# Patient Record
Sex: FEMALE | Race: WHITE | NOT HISPANIC OR LATINO | Employment: FULL TIME | URBAN - METROPOLITAN AREA
[De-identification: names, ages, dates, MRNs, and addresses within clinical notes are randomized per-mention and may not be internally consistent; named-entity substitution may affect disease eponyms.]

---

## 2021-05-06 ENCOUNTER — TELEPHONE (OUTPATIENT)
Dept: SCHEDULING | Facility: IMAGING CENTER | Age: 21
End: 2021-05-06

## 2021-05-07 SDOH — ECONOMIC STABILITY: TRANSPORTATION INSECURITY
IN THE PAST 12 MONTHS, HAS THE LACK OF TRANSPORTATION KEPT YOU FROM MEDICAL APPOINTMENTS OR FROM GETTING MEDICATIONS?: NO

## 2021-05-07 SDOH — HEALTH STABILITY: PHYSICAL HEALTH: ON AVERAGE, HOW MANY DAYS PER WEEK DO YOU ENGAGE IN MODERATE TO STRENUOUS EXERCISE (LIKE A BRISK WALK)?: 1 DAY

## 2021-05-07 SDOH — ECONOMIC STABILITY: HOUSING INSECURITY
IN THE LAST 12 MONTHS, WAS THERE A TIME WHEN YOU DID NOT HAVE A STEADY PLACE TO SLEEP OR SLEPT IN A SHELTER (INCLUDING NOW)?: NO

## 2021-05-07 SDOH — ECONOMIC STABILITY: HOUSING INSECURITY: IN THE LAST 12 MONTHS, HOW MANY PLACES HAVE YOU LIVED?: 2

## 2021-05-07 SDOH — ECONOMIC STABILITY: INCOME INSECURITY: IN THE LAST 12 MONTHS, WAS THERE A TIME WHEN YOU WERE NOT ABLE TO PAY THE MORTGAGE OR RENT ON TIME?: NO

## 2021-05-07 SDOH — HEALTH STABILITY: PHYSICAL HEALTH: ON AVERAGE, HOW MANY MINUTES DO YOU ENGAGE IN EXERCISE AT THIS LEVEL?: 40 MINUTES

## 2021-05-07 SDOH — ECONOMIC STABILITY: TRANSPORTATION INSECURITY
IN THE PAST 12 MONTHS, HAS LACK OF RELIABLE TRANSPORTATION KEPT YOU FROM MEDICAL APPOINTMENTS, MEETINGS, WORK OR FROM GETTING THINGS NEEDED FOR DAILY LIVING?: NO

## 2021-05-07 SDOH — HEALTH STABILITY: MENTAL HEALTH
STRESS IS WHEN SOMEONE FEELS TENSE, NERVOUS, ANXIOUS, OR CAN'T SLEEP AT NIGHT BECAUSE THEIR MIND IS TROUBLED. HOW STRESSED ARE YOU?: VERY MUCH

## 2021-05-07 ASSESSMENT — SOCIAL DETERMINANTS OF HEALTH (SDOH)
WITHIN THE PAST 12 MONTHS, YOU WORRIED THAT YOUR FOOD WOULD RUN OUT BEFORE YOU GOT THE MONEY TO BUY MORE: NEVER TRUE
IN A TYPICAL WEEK, HOW MANY TIMES DO YOU TALK ON THE PHONE WITH FAMILY, FRIENDS, OR NEIGHBORS?: TWICE A WEEK
HOW OFTEN DO YOU ATTEND CHURCH OR RELIGIOUS SERVICES?: DECLINE
DO YOU BELONG TO ANY CLUBS OR ORGANIZATIONS SUCH AS CHURCH GROUPS UNIONS, FRATERNAL OR ATHLETIC GROUPS, OR SCHOOL GROUPS?: NO
ARE YOU MARRIED, WIDOWED, DIVORCED, SEPARATED, NEVER MARRIED, OR LIVING WITH A PARTNER?: NEVER MARRIED
HOW HARD IS IT FOR YOU TO PAY FOR THE VERY BASICS LIKE FOOD, HOUSING, MEDICAL CARE, AND HEATING?: NOT VERY HARD
WITHIN THE PAST 12 MONTHS, THE FOOD YOU BOUGHT JUST DIDN'T LAST AND YOU DIDN'T HAVE MONEY TO GET MORE: NEVER TRUE
HOW MANY DRINKS CONTAINING ALCOHOL DO YOU HAVE ON A TYPICAL DAY WHEN YOU ARE DRINKING: PATIENT DECLINED
HOW OFTEN DO YOU GET TOGETHER WITH FRIENDS OR RELATIVES?: MORE THAN THREE TIMES A WEEK
HOW OFTEN DO YOU HAVE A DRINK CONTAINING ALCOHOL: NEVER
HOW OFTEN DO YOU ATTENT MEETINGS OF THE CLUB OR ORGANIZATION YOU BELONG TO?: DECLINE
HOW OFTEN DO YOU HAVE SIX OR MORE DRINKS ON ONE OCCASION: NEVER

## 2021-05-12 ENCOUNTER — OFFICE VISIT (OUTPATIENT)
Dept: MEDICAL GROUP | Facility: CLINIC | Age: 21
End: 2021-05-12
Payer: COMMERCIAL

## 2021-05-12 VITALS
WEIGHT: 210 LBS | BODY MASS INDEX: 33.75 KG/M2 | OXYGEN SATURATION: 98 % | HEIGHT: 66 IN | DIASTOLIC BLOOD PRESSURE: 70 MMHG | HEART RATE: 71 BPM | TEMPERATURE: 97.2 F | SYSTOLIC BLOOD PRESSURE: 114 MMHG

## 2021-05-12 DIAGNOSIS — Z00.00 BLOOD TESTS FOR ROUTINE GENERAL PHYSICAL EXAMINATION: ICD-10-CM

## 2021-05-12 DIAGNOSIS — R63.5 UNEXPLAINED WEIGHT GAIN: ICD-10-CM

## 2021-05-12 DIAGNOSIS — E66.9 OBESITY (BMI 30-39.9): ICD-10-CM

## 2021-05-12 DIAGNOSIS — L65.9 HAIR LOSS: ICD-10-CM

## 2021-05-12 DIAGNOSIS — F41.1 GAD (GENERALIZED ANXIETY DISORDER): ICD-10-CM

## 2021-05-12 DIAGNOSIS — R68.89 COLD INTOLERANCE: ICD-10-CM

## 2021-05-12 DIAGNOSIS — F33.1 MODERATE EPISODE OF RECURRENT MAJOR DEPRESSIVE DISORDER (HCC): ICD-10-CM

## 2021-05-12 DIAGNOSIS — G43.109 MIGRAINE WITH AURA AND WITHOUT STATUS MIGRAINOSUS, NOT INTRACTABLE: ICD-10-CM

## 2021-05-12 DIAGNOSIS — E55.9 VITAMIN D DEFICIENCY: ICD-10-CM

## 2021-05-12 PROBLEM — F32.9 MAJOR DEPRESSIVE DISORDER: Status: ACTIVE | Noted: 2021-05-12

## 2021-05-12 PROCEDURE — 99204 OFFICE O/P NEW MOD 45 MIN: CPT | Performed by: PHYSICIAN ASSISTANT

## 2021-05-12 RX ORDER — HYDROXYZINE PAMOATE 50 MG/1
50 CAPSULE ORAL
COMMUNITY
End: 2021-09-22 | Stop reason: SDUPTHER

## 2021-05-12 ASSESSMENT — ANXIETY QUESTIONNAIRES
6. BECOMING EASILY ANNOYED OR IRRITABLE: NEARLY EVERY DAY
GAD7 TOTAL SCORE: 20
7. FEELING AFRAID AS IF SOMETHING AWFUL MIGHT HAPPEN: NEARLY EVERY DAY
2. NOT BEING ABLE TO STOP OR CONTROL WORRYING: NEARLY EVERY DAY
4. TROUBLE RELAXING: NEARLY EVERY DAY
3. WORRYING TOO MUCH ABOUT DIFFERENT THINGS: NEARLY EVERY DAY
5. BEING SO RESTLESS THAT IT IS HARD TO SIT STILL: MORE THAN HALF THE DAYS
1. FEELING NERVOUS, ANXIOUS, OR ON EDGE: NEARLY EVERY DAY

## 2021-05-12 ASSESSMENT — PATIENT HEALTH QUESTIONNAIRE - PHQ9
CLINICAL INTERPRETATION OF PHQ2 SCORE: 5
5. POOR APPETITE OR OVEREATING: 3 - NEARLY EVERY DAY
SUM OF ALL RESPONSES TO PHQ QUESTIONS 1-9: 22

## 2021-05-12 NOTE — PROGRESS NOTES
Chief Complaint   Patient presents with   • Establish Care   • Orders Needed     requesting labs    • Anxiety     and depression        HPI:  Syeda is a 21 y.o. female new patient presenting with the following:    Major depressive disorder  This is a chronic health problem that is uncontrolled with current lifestyle measures. PHQ9 in the office today is 22 without thoughts of self harm. Currently on fluoxetine 20 mg without relief. Had brother who committed suicide in December and has had a significant increase in symptoms since that time. Would like to start counseling to help deal with anxiety and depression. We will discontinue the fluoxetine and start sertraline 50 mg daily. She will return in 2 weeks to check effectiveness of new medication.  Referral has been placed.    MAXIM (generalized anxiety disorder)  This is a chronic health problem that is uncontrolled with current medication and lifestyle measures. Her brother committed suicide in December and she is very anxious and depressed. GAD7 done in office is 20. Takes hydroxyzine 50 mg once a day as needed and fluoxetine 20 mg daily. Not helping currently. Discontinue fluoxetine and start sertraline 50 mg daily. Continue hydroxyzine. Wants to go to counseling to learn to deal with her problems. Referral placed.    Unexplained weight gain  Having problem with unexplained weight gain that she cannot lose, dry skin, hair loss and cold intolerance. She is requesting labs to check her thyroid levels. She will follow up in two weeks to go over results.    Obesity (BMI 30-39.9)  Body mass index is 33.89 kg/m². Patient has been diagnosed with obesity and has been counseled on caloric and carbohydrate restriction along with increasing exercise to 30 minutes 5 or more times a week.       Patient Active Problem List    Diagnosis Date Noted   • MAXIM (generalized anxiety disorder) 05/28/2021   • Obesity (BMI 30-39.9) 05/12/2021   • Major depressive disorder 05/12/2021   •  Cold intolerance 05/12/2021   • Migraine with aura and without status migrainosus, not intractable 05/12/2021   • Unexplained weight gain 05/12/2021   • Hair loss 05/12/2021       Current Outpatient Medications   Medication Sig Dispense Refill   • hydrOXYzine pamoate (VISTARIL) 50 MG Cap Take 50 mg by mouth 1 time a day as needed.     • sertraline (ZOLOFT) 100 MG Tab Take 1 tablet by mouth every day. 30 tablet 1     No current facility-administered medications for this visit.         Allergies as of 05/12/2021 - Reviewed 05/12/2021   Allergen Reaction Noted   • Pineapple Hives, Itching, Swelling, and Cough 05/12/2021   • Shellfish allergy Hives 05/12/2021        Social History     Socioeconomic History   • Marital status: Single     Spouse name: Not on file   • Number of children: 0   • Years of education: Not on file   • Highest education level: Some college, no degree   Occupational History   • Occupation:      Employer: OTHER     Comment: [employer]   Tobacco Use   • Smoking status: Never Smoker   • Smokeless tobacco: Never Used   Vaping Use   • Vaping Use: Never used   Substance and Sexual Activity   • Alcohol use: Never   • Drug use: Never   • Sexual activity: Not Currently     Partners: Male     Birth control/protection: I.U.D.   Other Topics Concern   • Not on file   Social History Narrative   • Not on file     Social Determinants of Health     Financial Resource Strain: Low Risk    • Difficulty of Paying Living Expenses: Not very hard   Food Insecurity: No Food Insecurity   • Worried About Running Out of Food in the Last Year: Never true   • Ran Out of Food in the Last Year: Never true   Transportation Needs: No Transportation Needs   • Lack of Transportation (Medical): No   • Lack of Transportation (Non-Medical): No   Physical Activity: Insufficiently Active   • Days of Exercise per Week: 1 day   • Minutes of Exercise per Session: 40 min   Stress: Stress Concern Present   • Feeling of  Stress : Very much   Social Connections: Unknown   • Frequency of Communication with Friends and Family: Twice a week   • Frequency of Social Gatherings with Friends and Family: More than three times a week   • Attends Orthodox Services: Patient refused   • Active Member of Clubs or Organizations: No   • Attends Club or Organization Meetings: Patient refused   • Marital Status: Never    Intimate Partner Violence:    • Fear of Current or Ex-Partner:    • Emotionally Abused:    • Physically Abused:    • Sexually Abused:        Family History   Problem Relation Age of Onset   • Other Mother         Aneurism   • Psychiatric Illness Mother         Anxiety and depression   • Arthritis Mother    • Thyroid Mother    • No Known Problems Father    • Thyroid Sister    • Psychiatric Illness Sister         Anxiety and depression   • Psychiatric Illness Brother         Suicide   • Kidney Disease Maternal Grandmother    • Cancer Maternal Grandfather         bone, skin   • Heart Attack Paternal Grandmother    • Heart Disease Paternal Grandmother         Heart transplant   • Hyperlipidemia Paternal Grandmother    • Hypertension Paternal Grandmother    • No Known Problems Paternal Grandfather    • No Known Problems Brother    • Diabetes Neg Hx        Past Surgical History:   Procedure Laterality Date   • SHOULDER ARTHROSCOPY W/ SLAP / LABRAL REPAIR Right 11/2017       Review of Systems:   Constitutional: Positive for unintentional weight gain. Negative for fever, chills, fatigue, loss of appetite.  HNT: Negative for nosebleeds, congestion, odynophagia, sore throat or changes in taste.    Eyes: Negative for vision changes.   Ears: Negative for recent hearing changes, pain or discharge.  Neck: Negative for pain, swelling, lumps or goiter.  Respiratory: Negative for cough, sputum production, shortness of breath and wheezing.    Cardiovascular: Negative for chest pain, palpitations, orthopnea and leg swelling.   Gastrointestinal:  "Negative for constipation, diarrhea, heartburn, dysphagia, nausea, vomiting or abdominal pain.   Genitourinary: Negative for dysuria, urgency and frequency.   Musculoskeletal: Negative for myalgias, joint pain, and back pain.  Skin: Positive for dry skin, hair loss, brittle, dry hair. Negative for other skin, hair or nail changes, rash, itching.   Neurological: Positive for migraines. Negative for dizziness, tingling, tremors, sensory change, gait/coordination changes, focal weakness.   Endo/Heme/Allergies: Does not bruise/bleed easily.   Psychiatric/Behavioral: Positive for depression and anxiety. Negative for suicidal ideas and memory loss.  The patient does not have insomnia.        Physical Exam:  /70 (BP Location: Left arm, Patient Position: Sitting, BP Cuff Size: Large adult)   Pulse 71   Temp 36.2 °C (97.2 °F) (Temporal)   Ht 1.676 m (5' 6\")   Wt 95.3 kg (210 lb)   SpO2 98%   BMI 33.89 kg/m²    General:  Well nourished, well developed female. With a Body mass index is 33.89 kg/m². No apparent distress.  Eyes: EOM intact, PERRL, conjunctiva non-injected, sclera non-icteric.  Neck: Neck supple with no cervical lymphadenopathy, JVD, palpable thyroid nodules or carotid bruits.  Pulmonary: Clear to ausculation bilaterally. Normal effort. No rales, ronchi, or wheezing.  Cardiovascular: Regular rate and rhythm without murmur, rub or gallop.   Extremities: Full range of motion. Warm and well perfused with no edema.  Skin: Intact with no obvious rashes or lesions.  Neuro: Cranial nerves I-XII grossly intact.  Psych: Alert and oriented x 3.  Appropriately dressed. Mood and affect appropriate.    Assessment/Plan:    1. Obesity (BMI 30-39.9)  Patient identified as having weight management issue.  Appropriate orders and counseling given.   2. Moderate episode of recurrent major depressive disorder (HCC)  Patient has been identified as having a positive depression screening. Appropriate orders and counseling " have been given.    REFERRAL TO PSYCHOLOGY    VITAMIN D,25 HYDROXY    DISCONTINUED: sertraline (ZOLOFT) 50 MG Tab   3. Cold intolerance  FREE THYROXINE    TSH   4. Migraine with aura and without status migrainosus, not intractable     5. Unexplained weight gain  FREE THYROXINE    TSH   6. Hair loss  CBC WITH DIFFERENTIAL    FREE THYROXINE    TSH   7. Vitamin D deficiency  VITAMIN D,25 HYDROXY   8. Blood tests for routine general physical examination  CBC WITH DIFFERENTIAL    Comp Metabolic Panel   9. MAXIM (generalized anxiety disorder)         Reviewed risks and benefits of treatment plan. Patient verbally agrees to plan of care.     Return in about 2 weeks (around 5/26/2021) for f/u labs and meds.      Please note that this dictation was created using voice recognition software. I have made every reasonable attempt to correct obvious errors, but I expect that there are errors of grammar and possibly content that I did not discover before finalizing the note.

## 2021-05-13 ENCOUNTER — NON-PROVIDER VISIT (OUTPATIENT)
Dept: MEDICAL GROUP | Facility: CLINIC | Age: 21
End: 2021-05-13
Payer: COMMERCIAL

## 2021-05-13 ENCOUNTER — HOSPITAL ENCOUNTER (OUTPATIENT)
Facility: MEDICAL CENTER | Age: 21
End: 2021-05-13
Attending: PHYSICIAN ASSISTANT
Payer: COMMERCIAL

## 2021-05-13 DIAGNOSIS — F33.1 MODERATE EPISODE OF RECURRENT MAJOR DEPRESSIVE DISORDER (HCC): ICD-10-CM

## 2021-05-13 DIAGNOSIS — E55.9 VITAMIN D DEFICIENCY: ICD-10-CM

## 2021-05-13 DIAGNOSIS — L65.9 HAIR LOSS: ICD-10-CM

## 2021-05-13 DIAGNOSIS — R63.5 UNEXPLAINED WEIGHT GAIN: ICD-10-CM

## 2021-05-13 DIAGNOSIS — Z01.89 ENCOUNTER FOR LABORATORY TEST: ICD-10-CM

## 2021-05-13 DIAGNOSIS — Z00.00 BLOOD TESTS FOR ROUTINE GENERAL PHYSICAL EXAMINATION: ICD-10-CM

## 2021-05-13 DIAGNOSIS — R68.89 COLD INTOLERANCE: ICD-10-CM

## 2021-05-13 PROCEDURE — 84443 ASSAY THYROID STIM HORMONE: CPT

## 2021-05-13 PROCEDURE — 85025 COMPLETE CBC W/AUTO DIFF WBC: CPT

## 2021-05-13 PROCEDURE — 36415 COLL VENOUS BLD VENIPUNCTURE: CPT | Performed by: PHYSICIAN ASSISTANT

## 2021-05-13 PROCEDURE — 84439 ASSAY OF FREE THYROXINE: CPT

## 2021-05-13 PROCEDURE — 80053 COMPREHEN METABOLIC PANEL: CPT

## 2021-05-13 PROCEDURE — 82306 VITAMIN D 25 HYDROXY: CPT

## 2021-05-14 LAB
ALBUMIN SERPL BCP-MCNC: 4.3 G/DL (ref 3.2–4.9)
ALBUMIN/GLOB SERPL: 1.3 G/DL
ALP SERPL-CCNC: 86 U/L (ref 30–99)
ALT SERPL-CCNC: 28 U/L (ref 2–50)
ANION GAP SERPL CALC-SCNC: 11 MMOL/L (ref 7–16)
AST SERPL-CCNC: 27 U/L (ref 12–45)
BASOPHILS # BLD AUTO: 0.6 % (ref 0–1.8)
BASOPHILS # BLD: 0.04 K/UL (ref 0–0.12)
BILIRUB SERPL-MCNC: 0.7 MG/DL (ref 0.1–1.5)
BUN SERPL-MCNC: 12 MG/DL (ref 8–22)
CALCIUM SERPL-MCNC: 9.6 MG/DL (ref 8.5–10.5)
CHLORIDE SERPL-SCNC: 105 MMOL/L (ref 96–112)
CO2 SERPL-SCNC: 25 MMOL/L (ref 20–33)
CREAT SERPL-MCNC: 0.84 MG/DL (ref 0.5–1.4)
EOSINOPHIL # BLD AUTO: 0.16 K/UL (ref 0–0.51)
EOSINOPHIL NFR BLD: 2.5 % (ref 0–6.9)
ERYTHROCYTE [DISTWIDTH] IN BLOOD BY AUTOMATED COUNT: 43.7 FL (ref 35.9–50)
GLOBULIN SER CALC-MCNC: 3.3 G/DL (ref 1.9–3.5)
GLUCOSE SERPL-MCNC: 96 MG/DL (ref 65–99)
HCT VFR BLD AUTO: 48.3 % (ref 37–47)
HGB BLD-MCNC: 15.5 G/DL (ref 12–16)
IMM GRANULOCYTES # BLD AUTO: 0.05 K/UL (ref 0–0.11)
IMM GRANULOCYTES NFR BLD AUTO: 0.8 % (ref 0–0.9)
LYMPHOCYTES # BLD AUTO: 1.8 K/UL (ref 1–4.8)
LYMPHOCYTES NFR BLD: 27.8 % (ref 22–41)
MCH RBC QN AUTO: 28.4 PG (ref 27–33)
MCHC RBC AUTO-ENTMCNC: 32.1 G/DL (ref 33.6–35)
MCV RBC AUTO: 88.6 FL (ref 81.4–97.8)
MONOCYTES # BLD AUTO: 0.41 K/UL (ref 0–0.85)
MONOCYTES NFR BLD AUTO: 6.3 % (ref 0–13.4)
NEUTROPHILS # BLD AUTO: 4.02 K/UL (ref 2–7.15)
NEUTROPHILS NFR BLD: 62 % (ref 44–72)
NRBC # BLD AUTO: 0 K/UL
NRBC BLD-RTO: 0 /100 WBC
PLATELET # BLD AUTO: 150 K/UL (ref 164–446)
PMV BLD AUTO: 10.4 FL (ref 9–12.9)
POTASSIUM SERPL-SCNC: 4.3 MMOL/L (ref 3.6–5.5)
PROT SERPL-MCNC: 7.6 G/DL (ref 6–8.2)
RBC # BLD AUTO: 5.45 M/UL (ref 4.2–5.4)
SODIUM SERPL-SCNC: 141 MMOL/L (ref 135–145)
T4 FREE SERPL-MCNC: 1.11 NG/DL (ref 0.93–1.7)
TSH SERPL DL<=0.005 MIU/L-ACNC: 2.2 UIU/ML (ref 0.38–5.33)
WBC # BLD AUTO: 6.5 K/UL (ref 4.8–10.8)

## 2021-05-16 LAB — 25(OH)D3 SERPL-MCNC: 20 NG/ML (ref 30–80)

## 2021-05-27 ENCOUNTER — OFFICE VISIT (OUTPATIENT)
Dept: MEDICAL GROUP | Facility: CLINIC | Age: 21
End: 2021-05-27
Payer: COMMERCIAL

## 2021-05-27 DIAGNOSIS — F33.1 MODERATE EPISODE OF RECURRENT MAJOR DEPRESSIVE DISORDER (HCC): ICD-10-CM

## 2021-05-27 DIAGNOSIS — E55.9 VITAMIN D DEFICIENCY: ICD-10-CM

## 2021-05-27 DIAGNOSIS — R63.5 UNEXPLAINED WEIGHT GAIN: ICD-10-CM

## 2021-05-27 PROCEDURE — 99214 OFFICE O/P EST MOD 30 MIN: CPT | Performed by: PHYSICIAN ASSISTANT

## 2021-05-27 RX ORDER — FLUOXETINE HYDROCHLORIDE 20 MG/1
CAPSULE ORAL
COMMUNITY
Start: 2021-03-07 | End: 2021-05-27

## 2021-05-27 RX ORDER — SERTRALINE HYDROCHLORIDE 100 MG/1
100 TABLET, FILM COATED ORAL DAILY
Qty: 30 TABLET | Refills: 1 | Status: SHIPPED | OUTPATIENT
Start: 2021-05-27 | End: 2021-06-10 | Stop reason: SDUPTHER

## 2021-05-27 ASSESSMENT — FIBROSIS 4 INDEX: FIB4 SCORE: 0.68

## 2021-05-27 NOTE — PROGRESS NOTES
Chief Complaint   Patient presents with   • Follow-Up       HISTORY OF PRESENT ILLNESS: Patient is a 21 y.o. female established patient who presents today to discuss the following issues:    Major depressive disorder  This is a chronic health problem that is uncontrolled with current medications and lifestyle measures. At last visit started her on sertraline 50 mg daily. States that she has not noticed any difference in the way she feels. We will try increasing her dose to 100 mg daily and follow up again in 2 weeks.    Vitamin D deficiency  Patient is currently being treated with supplemental vitamin D for measured deficiency. Taking extra vitamin D in addition to trying to include more in diet. No current side effects or issues. Taking vitamin D3 2000 units daily. Will recheck labs in one year.    Unexplained weight gain  Thyroid labs within normal limits. Discussed getting her depression under control could help stop the weight gain. She is starting to watch her diet more closely and trying not to emotional eat. She is also trying to be more physically active. We will continue to monitor.      Patient Active Problem List    Diagnosis Date Noted   • MAXIM (generalized anxiety disorder) 05/28/2021   • Vitamin D deficiency 05/28/2021   • Obesity (BMI 30-39.9) 05/12/2021   • Major depressive disorder 05/12/2021   • Cold intolerance 05/12/2021   • Migraine with aura and without status migrainosus, not intractable 05/12/2021   • Unexplained weight gain 05/12/2021   • Hair loss 05/12/2021       Allergies:Pineapple and Shellfish allergy    Current Outpatient Medications   Medication Sig Dispense Refill   • Cholecalciferol (VITAMIN D3) 2000 UNIT Cap Take 1 capsule by mouth every day.     • sertraline (ZOLOFT) 100 MG Tab Take 1 tablet by mouth every day. 30 tablet 1   • hydrOXYzine pamoate (VISTARIL) 50 MG Cap Take 50 mg by mouth 1 time a day as needed.       No current facility-administered medications for this visit.        Hospital Outpatient Visit on 05/13/2021   Component Date Value Ref Range Status   • Sodium 05/13/2021 141  135 - 145 mmol/L Final   • Potassium 05/13/2021 4.3  3.6 - 5.5 mmol/L Final   • Chloride 05/13/2021 105  96 - 112 mmol/L Final   • Co2 05/13/2021 25  20 - 33 mmol/L Final   • Anion Gap 05/13/2021 11.0  7.0 - 16.0 Final   • Glucose 05/13/2021 96  65 - 99 mg/dL Final   • Bun 05/13/2021 12  8 - 22 mg/dL Final   • Creatinine 05/13/2021 0.84  0.50 - 1.40 mg/dL Final   • Calcium 05/13/2021 9.6  8.5 - 10.5 mg/dL Final   • AST(SGOT) 05/13/2021 27  12 - 45 U/L Final   • ALT(SGPT) 05/13/2021 28  2 - 50 U/L Final   • Alkaline Phosphatase 05/13/2021 86  30 - 99 U/L Final   • Total Bilirubin 05/13/2021 0.7  0.1 - 1.5 mg/dL Final   • Albumin 05/13/2021 4.3  3.2 - 4.9 g/dL Final   • Total Protein 05/13/2021 7.6  6.0 - 8.2 g/dL Final   • Globulin 05/13/2021 3.3  1.9 - 3.5 g/dL Final   • A-G Ratio 05/13/2021 1.3  g/dL Final   • Free T-4 05/13/2021 1.11  0.93 - 1.70 ng/dL Final    Please note new FT4 reference range effective 4/29/2020.   • TSH 05/13/2021 2.200  0.380 - 5.330 uIU/mL Final    Comment: Please note new reference ranges effective 12/14/2017 10:00 AM    Pregnant Females, 1st Trimester  0.050-3.700  Pregnant Females, 2nd Trimester  0.310-4.350  Pregnant Females, 3rd Trimester  0.410-5.180     • 25-Hydroxy   Vitamin D 25 05/13/2021 20* 30 - 80 ng/mL Final    Comment: INTERPRETIVE INFORMATION: Vitamin D, 25-Hydroxy  This assay accurately quantifies the sum of vitamin D3, 25-hydroxy  and vitamin D2, 25-hydroxy.  0-17 years:  Deficiency: less than 20 ng/mL  Optimum level: greater than or equal to 20 ng/mL*  *(Chowdhury CL et al. Pediatrics 2008; 122: 1142-52.)  18 years and older:  Deficiency: Less than 20 ng/mL  Insufficiency: 20-29 ng/mL  Optimum Level: 30-80 ng/mL  Possible Toxicity: Greater than 150 ng/mL  Performed By: [facility]  [address] [place] [id number]  : Geno MCKEON  MD Khoi     • WBC 05/13/2021 6.5  4.8 - 10.8 K/uL Final   • RBC 05/13/2021 5.45* 4.20 - 5.40 M/uL Final   • Hemoglobin 05/13/2021 15.5  12.0 - 16.0 g/dL Final   • Hematocrit 05/13/2021 48.3* 37.0 - 47.0 % Final   • MCV 05/13/2021 88.6  81.4 - 97.8 fL Final   • MCH 05/13/2021 28.4  27.0 - 33.0 pg Final   • MCHC 05/13/2021 32.1* 33.6 - 35.0 g/dL Final   • RDW 05/13/2021 43.7  35.9 - 50.0 fL Final   • Platelet Count 05/13/2021 150* 164 - 446 K/uL Final   • MPV 05/13/2021 10.4  9.0 - 12.9 fL Final   • Neutrophils-Polys 05/13/2021 62.00  44.00 - 72.00 % Final   • Lymphocytes 05/13/2021 27.80  22.00 - 41.00 % Final   • Monocytes 05/13/2021 6.30  0.00 - 13.40 % Final   • Eosinophils 05/13/2021 2.50  0.00 - 6.90 % Final   • Basophils 05/13/2021 0.60  0.00 - 1.80 % Final   • Immature Granulocytes 05/13/2021 0.80  0.00 - 0.90 % Final   • Nucleated RBC 05/13/2021 0.00  /100 WBC Final   • Neutrophils (Absolute) 05/13/2021 4.02  2.00 - 7.15 K/uL Final    Includes immature neutrophils, if present.   • Lymphs (Absolute) 05/13/2021 1.80  1.00 - 4.80 K/uL Final   • Monos (Absolute) 05/13/2021 0.41  0.00 - 0.85 K/uL Final   • Eos (Absolute) 05/13/2021 0.16  0.00 - 0.51 K/uL Final   • Baso (Absolute) 05/13/2021 0.04  0.00 - 0.12 K/uL Final   • Immature Granulocytes (abs) 05/13/2021 0.05  0.00 - 0.11 K/uL Final   • NRBC (Absolute) 05/13/2021 0.00  K/uL Final   • GFR If  05/13/2021 >60  >60 mL/min/1.73 m 2 Final   • GFR If Non  05/13/2021 >60  >60 mL/min/1.73 m 2 Final   ]    The ASCVD Risk score (Shantanuholger SCANLON Jr., et al., 2013) failed to calculate for the following reasons:    The 2013 ASCVD risk score is only valid for ages 40 to 79    Social History     Tobacco Use   • Smoking status: Never Smoker   • Smokeless tobacco: Never Used   Vaping Use   • Vaping Use: Never used   Substance Use Topics   • Alcohol use: Never   • Drug use: Never       Family Status   Relation Name Status   • Mo  Alive   • Fa  Alive    • Sis  Alive   • Bro     • MGMo  Alive   • MGFa     • PGMo  Alive   • PGFa  Alive   • Bro  Alive   • Neg Hx  (Not Specified)     Family History   Problem Relation Age of Onset   • Other Mother         Aneurism   • Psychiatric Illness Mother         Anxiety and depression   • Arthritis Mother    • Thyroid Mother    • No Known Problems Father    • Thyroid Sister    • Psychiatric Illness Sister         Anxiety and depression   • Psychiatric Illness Brother         Suicide   • Kidney Disease Maternal Grandmother    • Cancer Maternal Grandfather         bone, skin   • Heart Attack Paternal Grandmother    • Heart Disease Paternal Grandmother         Heart transplant   • Hyperlipidemia Paternal Grandmother    • Hypertension Paternal Grandmother    • No Known Problems Paternal Grandfather    • No Known Problems Brother    • Diabetes Neg Hx        No LMP recorded (lmp unknown). Patient has had an implant.    Health Maintenance Summary                CHLAMYDIA SCREENING Overdue 2001     COVID-19 Vaccine Overdue 2013     IMM DTaP/Tdap/Td Vaccine Next Due 3/2/2022      Done 3/2/2012 Imm Admin: Tdap Vaccine     Patient has more history with this topic...           Review of Systems:   Constitutional: Negative for fever, chills, weight change, fatigue, loss of appetite.  HNT: Negative for nosebleeds, congestion, odynophagia, sore throat or changes in taste.    Eyes: Negative for vision changes.   Ears: Negative for recent changes in hearing, pain or discharge.  Neck: Negative for pain, swelling, lumps or goiter.  Respiratory: Negative for cough, sputum production, shortness of breath and wheezing.    Cardiovascular: Negative for chest pain, palpitations, orthopnea and leg swelling.   Gastrointestinal: Negative for constipation, diarrhea, heartburn, dysphagia, nausea, vomiting or abdominal pain.   Genitourinary: Negative for dysuria, urgency and frequency.   Musculoskeletal: Negative for myalgias, joint  "pain, and back pain.  Skin: Negative for skin, hair or nail changes, rash, itching.   Neurological: Negative for dizziness, tingling, tremors, sensory change, gait/coordination changes, focal weakness and headaches.   Endo/Heme/Allergies: Does not bruise/bleed easily.   Psychiatric/Behavioral: Positive for anxiety and depression. Negative for suicidal ideas and memory loss.  The patient does not have insomnia.        Exam:  /72 (BP Location: Left arm, Patient Position: Sitting, BP Cuff Size: Adult)   Pulse 76   Temp 36.8 °C (98.2 °F) (Temporal)   Resp 16   Ht 1.676 m (5' 6\")   Wt 96.6 kg (213 lb)   SpO2 97%  Body mass index is 34.38 kg/m².  General:  Well nourished, well developed female. Body mass index is 34.38 kg/m². No apparent distress.  Eyes: EOM intact, PERRL, conjunctiva non-injected, sclera non-icteric.  Neck: Supple with no cervical lymphadenopathy, JVD, palpable thyroid nodules or carotid bruits.  Pulmonary: Clear to ausculation bilaterally. Normal effort. No rales, ronchi, or wheezing.  Cardiovascular: Regular rate and rhythm without murmur, rub or gallop.   Extremities: Full range of motion. Warm and well perfused with no edema.  Skin: Intact with no obvious rashes or lesions.  Neuro: Cranial nerves I-XII intact.  Psych: Alert and oriented x 3.  Appropriately dressed. Mood and affect appropriate.      Assessment/Plan:  1. Moderate episode of recurrent major depressive disorder (HCC)  sertraline (ZOLOFT) 100 MG Tab   2. Vitamin D deficiency     3. Unexplained weight gain         Reviewed risks and benefits of treatment plan. Patient verbally agrees to plan of care.     Return in about 2 weeks (around 6/10/2021) for f/u depression meds.    Please note that this dictation was created using voice recognition software. I have made every reasonable attempt to correct obvious errors, but I expect that there are errors of grammar and possibly content that I did not discover before finalizing the " note.

## 2021-05-28 VITALS
HEIGHT: 66 IN | DIASTOLIC BLOOD PRESSURE: 72 MMHG | SYSTOLIC BLOOD PRESSURE: 120 MMHG | TEMPERATURE: 98.2 F | RESPIRATION RATE: 16 BRPM | WEIGHT: 213 LBS | OXYGEN SATURATION: 97 % | HEART RATE: 76 BPM | BODY MASS INDEX: 34.23 KG/M2

## 2021-05-28 PROBLEM — F41.1 GAD (GENERALIZED ANXIETY DISORDER): Status: ACTIVE | Noted: 2021-05-28

## 2021-05-28 PROBLEM — E55.9 VITAMIN D DEFICIENCY: Status: ACTIVE | Noted: 2021-05-28

## 2021-05-28 RX ORDER — ACETAMINOPHEN 160 MG
1 TABLET,DISINTEGRATING ORAL DAILY
COMMUNITY
End: 2023-08-15

## 2021-05-28 NOTE — ASSESSMENT & PLAN NOTE
Having problem with unexplained weight gain that she cannot lose, dry skin, hair loss and cold intolerance. She is requesting labs to check her thyroid levels. She will follow up in two weeks to go over results.

## 2021-05-28 NOTE — ASSESSMENT & PLAN NOTE
Thyroid labs within normal limits. Discussed getting her depression under control could help stop the weight gain. She is starting to watch her diet more closely and trying not to emotional eat. She is also trying to be more physically active. We will continue to monitor.

## 2021-05-28 NOTE — ASSESSMENT & PLAN NOTE
This is a chronic health problem that is uncontrolled with current lifestyle measures. PHQ9 in the office today is 22 without thoughts of self harm. Currently on fluoxetine 20 mg without relief. Had brother who committed suicide in December and has had a significant increase in symptoms since that time. Would like to start counseling to help deal with anxiety and depression. We will discontinue the fluoxetine and start sertraline 50 mg daily. She will return in 2 weeks to check effectiveness of new medication.  Referral has been placed.

## 2021-05-28 NOTE — ASSESSMENT & PLAN NOTE
Patient is currently being treated with supplemental vitamin D for measured deficiency. Taking extra vitamin D in addition to trying to include more in diet. No current side effects or issues. Taking vitamin D3 2000 units daily. Will recheck labs in one year.

## 2021-05-28 NOTE — ASSESSMENT & PLAN NOTE
Body mass index is 33.89 kg/m². Patient has been diagnosed with obesity and has been counseled on caloric and carbohydrate restriction along with increasing exercise to 30 minutes 5 or more times a week.

## 2021-05-28 NOTE — ASSESSMENT & PLAN NOTE
This is a chronic health problem that is uncontrolled with current medications and lifestyle measures. At last visit started her on sertraline 50 mg daily. States that she has not noticed any difference in the way she feels. We will try increasing her dose to 100 mg daily and follow up again in 2 weeks.

## 2021-05-28 NOTE — ASSESSMENT & PLAN NOTE
This is a chronic health problem that is uncontrolled with current medication and lifestyle measures. Her brother committed suicide in December and she is very anxious and depressed. GAD7 done in office is 20. Takes hydroxyzine 50 mg once a day as needed and fluoxetine 20 mg daily. Not helping currently. Discontinue fluoxetine and start sertraline 50 mg daily. Continue hydroxyzine. Wants to go to counseling to learn to deal with her problems. Referral placed.

## 2021-06-10 ENCOUNTER — OFFICE VISIT (OUTPATIENT)
Dept: MEDICAL GROUP | Facility: CLINIC | Age: 21
End: 2021-06-10
Payer: COMMERCIAL

## 2021-06-10 VITALS
TEMPERATURE: 98 F | SYSTOLIC BLOOD PRESSURE: 122 MMHG | BODY MASS INDEX: 33.82 KG/M2 | RESPIRATION RATE: 16 BRPM | OXYGEN SATURATION: 97 % | WEIGHT: 210.4 LBS | HEART RATE: 74 BPM | DIASTOLIC BLOOD PRESSURE: 86 MMHG | HEIGHT: 66 IN

## 2021-06-10 DIAGNOSIS — F33.1 MODERATE EPISODE OF RECURRENT MAJOR DEPRESSIVE DISORDER (HCC): ICD-10-CM

## 2021-06-10 PROCEDURE — 99213 OFFICE O/P EST LOW 20 MIN: CPT | Performed by: PHYSICIAN ASSISTANT

## 2021-06-10 RX ORDER — SERTRALINE HYDROCHLORIDE 100 MG/1
100 TABLET, FILM COATED ORAL DAILY
Qty: 90 TABLET | Refills: 1 | Status: SHIPPED | OUTPATIENT
Start: 2021-06-10 | End: 2021-09-22 | Stop reason: SDUPTHER

## 2021-06-10 ASSESSMENT — FIBROSIS 4 INDEX: FIB4 SCORE: 0.68

## 2021-06-10 NOTE — PROGRESS NOTES
Chief Complaint   Patient presents with   • Medication Management       HISTORY OF PRESENT ILLNESS: Patient is a 21 y.o. female established patient who presents today to discuss the following issues:    Major depressive disorder  Increased dose of sertraline to 100 mg daily at last visit. Patient states that she is doing fantastic on current dose. Only used anxiety med's once in last 3 weeks. Mellowed out mood swings. No complaints of side effects. She is very happy and would like to continue on this medication at the current dose. Refill sent to the pharmacy.      Patient Active Problem List    Diagnosis Date Noted   • MAXIM (generalized anxiety disorder) 05/28/2021   • Vitamin D deficiency 05/28/2021   • Obesity (BMI 30-39.9) 05/12/2021   • Major depressive disorder 05/12/2021   • Cold intolerance 05/12/2021   • Migraine with aura and without status migrainosus, not intractable 05/12/2021   • Unexplained weight gain 05/12/2021   • Hair loss 05/12/2021       Allergies:Pineapple and Shellfish allergy    Current Outpatient Medications   Medication Sig Dispense Refill   • sertraline (ZOLOFT) 100 MG Tab Take 1 tablet by mouth every day. 90 tablet 1   • Cholecalciferol (VITAMIN D3) 2000 UNIT Cap Take 1 capsule by mouth every day.     • hydrOXYzine pamoate (VISTARIL) 50 MG Cap Take 50 mg by mouth 1 time a day as needed.       No current facility-administered medications for this visit.       Hospital Outpatient Visit on 05/13/2021   Component Date Value Ref Range Status   • Sodium 05/13/2021 141  135 - 145 mmol/L Final   • Potassium 05/13/2021 4.3  3.6 - 5.5 mmol/L Final   • Chloride 05/13/2021 105  96 - 112 mmol/L Final   • Co2 05/13/2021 25  20 - 33 mmol/L Final   • Anion Gap 05/13/2021 11.0  7.0 - 16.0 Final   • Glucose 05/13/2021 96  65 - 99 mg/dL Final   • Bun 05/13/2021 12  8 - 22 mg/dL Final   • Creatinine 05/13/2021 0.84  0.50 - 1.40 mg/dL Final   • Calcium 05/13/2021 9.6  8.5 - 10.5 mg/dL Final   • AST(SGOT)  05/13/2021 27  12 - 45 U/L Final   • ALT(SGPT) 05/13/2021 28  2 - 50 U/L Final   • Alkaline Phosphatase 05/13/2021 86  30 - 99 U/L Final   • Total Bilirubin 05/13/2021 0.7  0.1 - 1.5 mg/dL Final   • Albumin 05/13/2021 4.3  3.2 - 4.9 g/dL Final   • Total Protein 05/13/2021 7.6  6.0 - 8.2 g/dL Final   • Globulin 05/13/2021 3.3  1.9 - 3.5 g/dL Final   • A-G Ratio 05/13/2021 1.3  g/dL Final   • Free T-4 05/13/2021 1.11  0.93 - 1.70 ng/dL Final    Please note new FT4 reference range effective 4/29/2020.   • TSH 05/13/2021 2.200  0.380 - 5.330 uIU/mL Final    Comment: Please note new reference ranges effective 12/14/2017 10:00 AM    Pregnant Females, 1st Trimester  0.050-3.700  Pregnant Females, 2nd Trimester  0.310-4.350  Pregnant Females, 3rd Trimester  0.410-5.180     • 25-Hydroxy   Vitamin D 25 05/13/2021 20* 30 - 80 ng/mL Final    Comment: INTERPRETIVE INFORMATION: Vitamin D, 25-Hydroxy  This assay accurately quantifies the sum of vitamin D3, 25-hydroxy  and vitamin D2, 25-hydroxy.  0-17 years:  Deficiency: less than 20 ng/mL  Optimum level: greater than or equal to 20 ng/mL*  *(Chowdhury CL et al. Pediatrics 2008; 122: 1142-52.)  18 years and older:  Deficiency: Less than 20 ng/mL  Insufficiency: 20-29 ng/mL  Optimum Level: 30-80 ng/mL  Possible Toxicity: Greater than 150 ng/mL  Performed By: [facility] [facility]  [address] [place] [id number]  : Geno Westfall MD     • WBC 05/13/2021 6.5  4.8 - 10.8 K/uL Final   • RBC 05/13/2021 5.45* 4.20 - 5.40 M/uL Final   • Hemoglobin 05/13/2021 15.5  12.0 - 16.0 g/dL Final   • Hematocrit 05/13/2021 48.3* 37.0 - 47.0 % Final   • MCV 05/13/2021 88.6  81.4 - 97.8 fL Final   • MCH 05/13/2021 28.4  27.0 - 33.0 pg Final   • MCHC 05/13/2021 32.1* 33.6 - 35.0 g/dL Final   • RDW 05/13/2021 43.7  35.9 - 50.0 fL Final   • Platelet Count 05/13/2021 150* 164 - 446 K/uL Final   • MPV 05/13/2021 10.4  9.0 - 12.9 fL Final   • Neutrophils-Polys 05/13/2021 62.00   44.00 - 72.00 % Final   • Lymphocytes 2021 27.80  22.00 - 41.00 % Final   • Monocytes 2021 6.30  0.00 - 13.40 % Final   • Eosinophils 2021 2.50  0.00 - 6.90 % Final   • Basophils 2021 0.60  0.00 - 1.80 % Final   • Immature Granulocytes 2021 0.80  0.00 - 0.90 % Final   • Nucleated RBC 2021 0.00  /100 WBC Final   • Neutrophils (Absolute) 2021 4.02  2.00 - 7.15 K/uL Final    Includes immature neutrophils, if present.   • Lymphs (Absolute) 2021 1.80  1.00 - 4.80 K/uL Final   • Monos (Absolute) 2021 0.41  0.00 - 0.85 K/uL Final   • Eos (Absolute) 2021 0.16  0.00 - 0.51 K/uL Final   • Baso (Absolute) 2021 0.04  0.00 - 0.12 K/uL Final   • Immature Granulocytes (abs) 2021 0.05  0.00 - 0.11 K/uL Final   • NRBC (Absolute) 2021 0.00  K/uL Final   • GFR If  2021 >60  >60 mL/min/1.73 m 2 Final   • GFR If Non  2021 >60  >60 mL/min/1.73 m 2 Final   ]    The ASCVD Risk score (Haviland DC Jr., et al., 2013) failed to calculate for the following reasons:    The 2013 ASCVD risk score is only valid for ages 40 to 79    Social History     Tobacco Use   • Smoking status: Never Smoker   • Smokeless tobacco: Never Used   Vaping Use   • Vaping Use: Never used   Substance Use Topics   • Alcohol use: Never   • Drug use: Never       Family Status   Relation Name Status   • Mo  Alive   • Fa  Alive   • Sis  Alive   • Bro     • MGMo  Alive   • MGFa     • PGMo  Alive   • PGFa  Alive   • Bro  Alive   • Neg Hx  (Not Specified)     Family History   Problem Relation Age of Onset   • Other Mother         Aneurism   • Psychiatric Illness Mother         Anxiety and depression   • Arthritis Mother    • Thyroid Mother    • No Known Problems Father    • Thyroid Sister    • Psychiatric Illness Sister         Anxiety and depression   • Psychiatric Illness Brother         Suicide   • Kidney Disease Maternal Grandmother    •  Cancer Maternal Grandfather         bone, skin   • Heart Attack Paternal Grandmother    • Heart Disease Paternal Grandmother         Heart transplant   • Hyperlipidemia Paternal Grandmother    • Hypertension Paternal Grandmother    • No Known Problems Paternal Grandfather    • No Known Problems Brother    • Diabetes Neg Hx        No LMP recorded (lmp unknown). Patient has had an implant.    Health Maintenance Summary                CHLAMYDIA SCREENING Overdue 2001     COVID-19 Vaccine Overdue 1/25/2013     IMM DTaP/Tdap/Td Vaccine Next Due 3/2/2022      Done 3/2/2012 Imm Admin: Tdap Vaccine     Patient has more history with this topic...           Review of Systems:   Constitutional: Negative for fever, chills, weight change, fatigue, loss of appetite.  HNT: Negative for nosebleeds, congestion, odynophagia, sore throat or changes in taste.    Eyes: Negative for vision changes.   Ears: Negative for recent changes in hearing, pain or discharge.  Neck: Negative for pain, swelling, lumps or goiter.  Respiratory: Negative for cough, sputum production, shortness of breath and wheezing.    Cardiovascular: Negative for chest pain, palpitations, orthopnea and leg swelling.   Gastrointestinal: Negative for constipation, diarrhea, heartburn, dysphagia, nausea, vomiting or abdominal pain.   Genitourinary: Negative for dysuria, urgency and frequency.   Musculoskeletal: Negative for myalgias, joint pain, and back pain.  Skin: Negative for skin, hair or nail changes, rash, itching.   Neurological: Negative for dizziness, tingling, tremors, sensory change, gait/coordination changes, focal weakness and headaches.   Endo/Heme/Allergies: Does not bruise/bleed easily.   Psychiatric/Behavioral: Positive for anxiety and depression that are currently controlled. Negative for suicidal ideas and memory loss.  The patient does not have insomnia.        Exam:  /86   Pulse 74   Temp 36.7 °C (98 °F) (Temporal)   Resp 16   Ht  "1.676 m (5' 6\")   Wt 95.4 kg (210 lb 6.4 oz)   SpO2 97%  Body mass index is 33.96 kg/m².  General:  Well nourished, well developed female. Body mass index is 33.96 kg/m². No apparent distress.  Eyes: EOM intact, PERRL, conjunctiva non-injected, sclera non-icteric.  Neck: Supple with no cervical lymphadenopathy, JVD, palpable thyroid nodules or carotid bruits.  Pulmonary: Clear to ausculation bilaterally. Normal effort. No rales, ronchi, or wheezing.  Cardiovascular: Regular rate and rhythm without murmur, rub or gallop.   Extremities: Full range of motion. Warm and well perfused with no edema.  Skin: Intact with no obvious rashes or lesions.  Neuro: Cranial nerves I-XII intact.  Psych: Alert and oriented x 3.  Appropriately dressed. Mood and affect appropriate.      Assessment/Plan:  1. Moderate episode of recurrent major depressive disorder (HCC)  sertraline (ZOLOFT) 100 MG Tab       Reviewed risks and benefits of treatment plan. Patient verbally agrees to plan of care.     Return in about 3 months (around 9/10/2021) for f/u medication.    Please note that this dictation was created using voice recognition software. I have made every reasonable attempt to correct obvious errors, but I expect that there are errors of grammar and possibly content that I did not discover before finalizing the note.  "

## 2021-06-10 NOTE — ASSESSMENT & PLAN NOTE
Increased dose of sertraline to 100 mg daily at last visit. Patient states that she is doing fantastic on current dose. Only used anxiety med's once in last 3 weeks. Mellowed out mood swings. No complaints of side effects. She is very happy and would like to continue on this medication at the current dose. Refill sent to the pharmacy.

## 2021-08-24 ENCOUNTER — OFFICE VISIT (OUTPATIENT)
Dept: URGENT CARE | Facility: PHYSICIAN GROUP | Age: 21
End: 2021-08-24
Payer: COMMERCIAL

## 2021-08-24 VITALS
SYSTOLIC BLOOD PRESSURE: 122 MMHG | WEIGHT: 212 LBS | HEIGHT: 66 IN | TEMPERATURE: 98.2 F | DIASTOLIC BLOOD PRESSURE: 62 MMHG | BODY MASS INDEX: 34.07 KG/M2 | RESPIRATION RATE: 16 BRPM | OXYGEN SATURATION: 96 % | HEART RATE: 75 BPM

## 2021-08-24 DIAGNOSIS — J03.00 STREP TONSILLITIS: ICD-10-CM

## 2021-08-24 DIAGNOSIS — J02.9 SORE THROAT: ICD-10-CM

## 2021-08-24 LAB
INT CON NEG: NORMAL
INT CON POS: NORMAL
S PYO AG THROAT QL: NORMAL

## 2021-08-24 PROCEDURE — 99213 OFFICE O/P EST LOW 20 MIN: CPT | Performed by: FAMILY MEDICINE

## 2021-08-24 PROCEDURE — 87880 STREP A ASSAY W/OPTIC: CPT | Performed by: FAMILY MEDICINE

## 2021-08-24 RX ORDER — AMOXICILLIN 500 MG/1
500 CAPSULE ORAL 2 TIMES DAILY
Qty: 20 CAPSULE | Refills: 0 | Status: SHIPPED | OUTPATIENT
Start: 2021-08-24 | End: 2021-09-03

## 2021-08-24 ASSESSMENT — ENCOUNTER SYMPTOMS: SORE THROAT: 1

## 2021-08-24 ASSESSMENT — FIBROSIS 4 INDEX: FIB4 SCORE: 0.68

## 2021-08-25 NOTE — PROGRESS NOTES
"Subjective     Syeda Santiago is a 21 y.o. female who presents with Sore Throat (White patches, swollen, x4days )      - This is a pleasant and nontoxic appearing 21 y.o. female with c/o 4 days w/ sore throat. Feels well othrwise. No NVFC      ALLERGIES:  Pineapple and Shellfish allergy     PMH:  Past Medical History:   Diagnosis Date   • Allergy     pineapple, seafood (mild)   • Anxiety    • Depression    • Head ache    • Migraine         PSH:  Past Surgical History:   Procedure Laterality Date   • SHOULDER ARTHROSCOPY W/ SLAP / LABRAL REPAIR Right 11/2017       MEDS:    Current Outpatient Medications:   •  amoxicillin (AMOXIL) 500 MG Cap, Take 1 Capsule by mouth 2 times a day for 10 days., Disp: 20 Capsule, Rfl: 0  •  sertraline (ZOLOFT) 100 MG Tab, Take 1 tablet by mouth every day., Disp: 90 tablet, Rfl: 1  •  Cholecalciferol (VITAMIN D3) 2000 UNIT Cap, Take 1 capsule by mouth every day., Disp: , Rfl:   •  hydrOXYzine pamoate (VISTARIL) 50 MG Cap, Take 50 mg by mouth 1 time a day as needed., Disp: , Rfl:     ** I have documented what I find to be significant in regards to past medical, social, family and surgical history  in my HPI or under PMH/PSH/FH review section, otherwise it is noncontributory **           HPI    Review of Systems   HENT: Positive for sore throat.    All other systems reviewed and are negative.             Objective     /62   Pulse 75   Temp 36.8 °C (98.2 °F) (Temporal)   Resp 16   Ht 1.676 m (5' 6\")   Wt 96.2 kg (212 lb)   SpO2 96%   BMI 34.22 kg/m²      Physical Exam  Vitals and nursing note reviewed.   Constitutional:       General: She is not in acute distress.     Appearance: Normal appearance. She is well-developed.   HENT:      Head: Normocephalic and atraumatic.      Mouth/Throat:      Mouth: Mucous membranes are moist.      Pharynx: Oropharyngeal exudate and posterior oropharyngeal erythema present.   Eyes:      General: No scleral icterus.  Cardiovascular:      Heart " sounds: Normal heart sounds. No murmur heard.     Pulmonary:      Effort: Pulmonary effort is normal. No respiratory distress.      Breath sounds: Normal breath sounds.   Musculoskeletal:      Cervical back: Tenderness present.   Lymphadenopathy:      Cervical: Cervical adenopathy present.   Skin:     Coloration: Skin is not jaundiced or pale.   Neurological:      Mental Status: She is alert.      Motor: No abnormal muscle tone.   Psychiatric:         Mood and Affect: Mood normal.         Behavior: Behavior normal.                             Assessment & Plan          1. Sore throat  POCT Rapid Strep A   2. Strep tonsillitis  amoxicillin (AMOXIL) 500 MG Cap       - Dx, plan & d/c instructions discussed w/ patient   - Rest, stay hydrated OTC Motrin and/or Tylenol as needed  - E.R. precautions discussed     Asked to kindly follow up with their PCP's office in 2-3 days for a recheck, ER if not improving or feeling/getting worse.    Any realistic side effects of medications that may have been given today reviewed.     Patient left in stable condition       POCT results reviewed/discussed

## 2021-09-22 ENCOUNTER — OFFICE VISIT (OUTPATIENT)
Dept: MEDICAL GROUP | Facility: CLINIC | Age: 21
End: 2021-09-22
Payer: COMMERCIAL

## 2021-09-22 VITALS
DIASTOLIC BLOOD PRESSURE: 80 MMHG | HEIGHT: 66 IN | RESPIRATION RATE: 16 BRPM | HEART RATE: 83 BPM | SYSTOLIC BLOOD PRESSURE: 122 MMHG | OXYGEN SATURATION: 98 % | BODY MASS INDEX: 34.23 KG/M2 | WEIGHT: 213 LBS | TEMPERATURE: 96.9 F

## 2021-09-22 DIAGNOSIS — F41.1 GAD (GENERALIZED ANXIETY DISORDER): ICD-10-CM

## 2021-09-22 DIAGNOSIS — F33.1 MODERATE EPISODE OF RECURRENT MAJOR DEPRESSIVE DISORDER (HCC): ICD-10-CM

## 2021-09-22 DIAGNOSIS — G43.109 MIGRAINE WITH AURA AND WITHOUT STATUS MIGRAINOSUS, NOT INTRACTABLE: ICD-10-CM

## 2021-09-22 PROCEDURE — 99214 OFFICE O/P EST MOD 30 MIN: CPT | Performed by: PHYSICIAN ASSISTANT

## 2021-09-22 RX ORDER — HYDROXYZINE 50 MG/1
50 TABLET, FILM COATED ORAL 3 TIMES DAILY PRN
Qty: 30 TABLET | Refills: 0 | Status: SHIPPED | OUTPATIENT
Start: 2021-09-22 | End: 2021-11-03

## 2021-09-22 RX ORDER — HYDROXYZINE PAMOATE 50 MG/1
50 CAPSULE ORAL 3 TIMES DAILY PRN
Qty: 90 CAPSULE | Refills: 5 | Status: SHIPPED | OUTPATIENT
Start: 2021-09-22 | End: 2022-05-12

## 2021-09-22 RX ORDER — RIZATRIPTAN BENZOATE 5 MG/1
5 TABLET ORAL
Qty: 10 TABLET | Refills: 3 | Status: SHIPPED | OUTPATIENT
Start: 2021-09-22 | End: 2021-10-07 | Stop reason: SINTOL

## 2021-09-22 RX ORDER — SERTRALINE HYDROCHLORIDE 100 MG/1
100 TABLET, FILM COATED ORAL DAILY
Qty: 90 EACH | Refills: 3 | Status: SHIPPED | OUTPATIENT
Start: 2021-09-22 | End: 2022-05-12

## 2021-09-22 RX ORDER — SERTRALINE HYDROCHLORIDE 100 MG/1
100 TABLET, FILM COATED ORAL DAILY
Qty: 15 TABLET | Refills: 0 | Status: SHIPPED | OUTPATIENT
Start: 2021-09-22 | End: 2021-10-27

## 2021-09-22 ASSESSMENT — ENCOUNTER SYMPTOMS
MUSCULOSKELETAL NEGATIVE: 1
HEADACHES: 1
NERVOUS/ANXIOUS: 1
CONSTITUTIONAL NEGATIVE: 1
GASTROINTESTINAL NEGATIVE: 1
CARDIOVASCULAR NEGATIVE: 1
DEPRESSION: 1
RESPIRATORY NEGATIVE: 1
EYES NEGATIVE: 1

## 2021-09-22 ASSESSMENT — FIBROSIS 4 INDEX: FIB4 SCORE: 0.68

## 2021-09-22 ASSESSMENT — VISUAL ACUITY: OU: 1

## 2021-09-23 NOTE — PROGRESS NOTES
Subjective   Syeda Santiago is a 21 y.o. female who presents with Medication Management    1. Moderate episode of recurrent major depressive disorder (HCC)  Chronic condition well-controlled on current medication.  Patient is requesting refills today.  He would like a 90-day supply with refills sent to mail order pharmacy and a 2-week prescription sent to [facility].  - sertraline (ZOLOFT) 100 MG Tab; Take 1 Tablet by mouth every day.  Dispense: 90 Each; Refill: 3  - sertraline (ZOLOFT) 100 MG Tab; Take 1 Tablet by mouth every day.  Dispense: 15 Tablet; Refill: 0    2. MAXIM (generalized anxiety disorder)  Chronic condition well controlled on current medications.  Patient is requesting refill today.  Patient ran out of her medication approximately 2 weeks ago.  She is requesting 90-day supply with refills sent to mail order pharmacy and a 2 weeks supply sent to [facility].  - hydrOXYzine pamoate (VISTARIL) 50 MG Cap; Take 1 Capsule by mouth 3 times a day as needed for Anxiety.  Dispense: 90 Capsule; Refill: 5  - hydrOXYzine HCl (ATARAX) 50 MG Tab; Take 1 Tablet by mouth 3 times a day as needed for Anxiety.  Dispense: 30 Tablet; Refill: 0    3. Migraine with aura and without status migrainosus, not intractable  This is a chronic condition for this patient for which she uses over-the-counter medications with minimal relief.  She states that she is having more frequent migraines and is looking for medication to help control her headaches.  We will start her on rizatriptan 5 mg.  She is to take 1 at the start of a migraine and she is able to take a second dose 2 hours later should her migraine continue.  She is not to take more than 2 doses in any 24-hour period.  - rizatriptan (MAXALT) 5 MG tablet; Take 1 Tablet by mouth one time as needed for Migraine for up to 1 dose.  Dispense: 10 Tablet; Refill: 3    Past Medical History:  No date: Allergy      Comment:  pineapple, seafood (mild)  No date: Anxiety  No date: Depression  No  date: Head ache  No date: Migraine  Past Surgical History:  11/2017: SHOULDER ARTHROSCOPY W/ SLAP / LABRAL REPAIR; Right  Social History    Tobacco Use      Smoking status: Never Smoker      Smokeless tobacco: Never Used    Vaping Use      Vaping Use: Never used    Alcohol use: Never    Drug use: Never    Review of patient's family history indicates:  Problem: Other      Relation: Mother          Age of Onset: (Not Specified)          Comment: Aneurism  Problem: Psychiatric Illness      Relation: Mother          Age of Onset: (Not Specified)          Comment: Anxiety and depression  Problem: Arthritis      Relation: Mother          Age of Onset: (Not Specified)  Problem: Thyroid      Relation: Mother          Age of Onset: (Not Specified)  Problem: No Known Problems      Relation: Father          Age of Onset: (Not Specified)  Problem: Thyroid      Relation: Sister          Age of Onset: (Not Specified)  Problem: Psychiatric Illness      Relation: Sister          Age of Onset: (Not Specified)          Comment: Anxiety and depression  Problem: Psychiatric Illness      Relation: Brother          Age of Onset: (Not Specified)          Comment: Suicide  Problem: Kidney Disease      Relation: Maternal Grandmother          Age of Onset: (Not Specified)  Problem: Cancer      Relation: Maternal Grandfather          Age of Onset: (Not Specified)          Comment: bone, skin  Problem: Heart Attack      Relation: Paternal Grandmother          Age of Onset: (Not Specified)  Problem: Heart Disease      Relation: Paternal Grandmother          Age of Onset: (Not Specified)          Comment: Heart transplant  Problem: Hyperlipidemia      Relation: Paternal Grandmother          Age of Onset: (Not Specified)  Problem: Hypertension      Relation: Paternal Grandmother          Age of Onset: (Not Specified)  Problem: No Known Problems      Relation: Paternal Grandfather          Age of Onset: (Not Specified)  Problem: No Known  "Problems      Relation: Brother          Age of Onset: (Not Specified)  Problem: Diabetes      Relation: Neg Hx          Age of Onset: (Not Specified)      Current Outpatient Medications: •  hydrOXYzine pamoate (VISTARIL) 50 MG Cap, Take 1 Capsule by mouth 3 times a day as needed for Anxiety., Disp: 90 Capsule, Rfl: 5•  sertraline (ZOLOFT) 100 MG Tab, Take 1 Tablet by mouth every day., Disp: 90 Each, Rfl: 3•  rizatriptan (MAXALT) 5 MG tablet, Take 1 Tablet by mouth one time as needed for Migraine for up to 1 dose., Disp: 10 Tablet, Rfl: 3•  hydrOXYzine HCl (ATARAX) 50 MG Tab, Take 1 Tablet by mouth 3 times a day as needed for Anxiety., Disp: 30 Tablet, Rfl: 0•  sertraline (ZOLOFT) 100 MG Tab, Take 1 Tablet by mouth every day., Disp: 15 Tablet, Rfl: 0•  Cholecalciferol (VITAMIN D3) 2000 UNIT Cap, Take 1 capsule by mouth every day., Disp: , Rfl:     Patient was instructed on the use of medications, either prescriptions or OTC and informed on when the appropriate follow up time period should be. In addition, patient was also instructed that should any acute worsening occur that they should notify this clinic asap or call 911.      Review of Systems   Constitutional: Negative.    HENT: Negative.    Eyes: Negative.    Respiratory: Negative.    Cardiovascular: Negative.    Gastrointestinal: Negative.    Genitourinary: Negative.    Musculoskeletal: Negative.    Skin: Negative.    Neurological: Positive for headaches.   Endo/Heme/Allergies: Negative.    Psychiatric/Behavioral: Positive for depression. The patient is nervous/anxious.      Objective     /80 (BP Location: Left arm, Patient Position: Sitting, BP Cuff Size: Large adult)   Pulse 83   Temp 36.1 °C (96.9 °F) (Temporal)   Resp 16   Ht 1.676 m (5' 6\")   Wt 96.6 kg (213 lb) Comment: with shoes on  SpO2 98%   BMI 34.38 kg/m²      Physical Exam  Vitals and nursing note reviewed.   Constitutional:       Appearance: Normal appearance. She is well-developed " and well-groomed. She is obese.   HENT:      Head: Normocephalic and atraumatic.      Nose: Nose normal.      Mouth/Throat:      Lips: Pink. No lesions.      Mouth: Mucous membranes are moist.   Eyes:      General: Lids are normal. Vision grossly intact. Gaze aligned appropriately.      Extraocular Movements: Extraocular movements intact.      Conjunctiva/sclera: Conjunctivae normal.      Pupils: Pupils are equal, round, and reactive to light.   Neck:      Thyroid: No thyromegaly.      Vascular: No carotid bruit or JVD.      Trachea: Trachea and phonation normal.   Cardiovascular:      Rate and Rhythm: Normal rate and regular rhythm.      Heart sounds: Normal heart sounds. No murmur heard.   No friction rub. No gallop.    Pulmonary:      Effort: Pulmonary effort is normal.      Breath sounds: Normal breath sounds. No wheezing, rhonchi or rales.   Musculoskeletal:         General: Normal range of motion.      Cervical back: Normal range of motion and neck supple.      Right lower leg: No edema.      Left lower leg: No edema.   Lymphadenopathy:      Cervical: No cervical adenopathy.   Skin:     General: Skin is warm and dry.      Capillary Refill: Capillary refill takes less than 2 seconds.      Findings: No lesion or rash.   Neurological:      Mental Status: She is alert and oriented to person, place, and time.      Cranial Nerves: Cranial nerves are intact.   Psychiatric:         Attention and Perception: Attention and perception normal.         Mood and Affect: Affect normal. Mood is anxious.         Speech: Speech normal.         Behavior: Behavior normal. Behavior is cooperative.         Thought Content: Thought content normal.         Judgment: Judgment normal.       Assessment & Plan      1. Moderate episode of recurrent major depressive disorder (HCC)  - sertraline (ZOLOFT) 100 MG Tab; Take 1 Tablet by mouth every day.  Dispense: 90 Each; Refill: 3  - sertraline (ZOLOFT) 100 MG Tab; Take 1 Tablet by mouth  every day.  Dispense: 15 Tablet; Refill: 0    2. MAXIM (generalized anxiety disorder)  - hydrOXYzine pamoate (VISTARIL) 50 MG Cap; Take 1 Capsule by mouth 3 times a day as needed for Anxiety.  Dispense: 90 Capsule; Refill: 5  - hydrOXYzine HCl (ATARAX) 50 MG Tab; Take 1 Tablet by mouth 3 times a day as needed for Anxiety.  Dispense: 30 Tablet; Refill: 0    3. Migraine with aura and without status migrainosus, not intractable  - rizatriptan (MAXALT) 5 MG tablet; Take 1 Tablet by mouth one time as needed for Migraine for up to 1 dose.  Dispense: 10 Tablet; Refill: 3

## 2021-10-07 DIAGNOSIS — G43.109 MIGRAINE WITH AURA AND WITHOUT STATUS MIGRAINOSUS, NOT INTRACTABLE: ICD-10-CM

## 2021-10-07 RX ORDER — ELETRIPTAN HYDROBROMIDE 20 MG/1
20 TABLET, FILM COATED ORAL
Qty: 10 TABLET | Refills: 0 | Status: SHIPPED | OUTPATIENT
Start: 2021-10-07 | End: 2022-02-28

## 2021-10-07 NOTE — PROGRESS NOTES
Patient has been unable to tolerate sumatriptan and rizatriptan. We will try eletriptan to see if he can tolerate this medication. It will likely require prior authorization for use so please initiate if needed.

## 2021-10-14 ENCOUNTER — OFFICE VISIT (OUTPATIENT)
Dept: URGENT CARE | Facility: CLINIC | Age: 21
End: 2021-10-14
Payer: COMMERCIAL

## 2021-10-14 ENCOUNTER — HOSPITAL ENCOUNTER (OUTPATIENT)
Facility: MEDICAL CENTER | Age: 21
End: 2021-10-14
Attending: PHYSICIAN ASSISTANT
Payer: COMMERCIAL

## 2021-10-14 VITALS
SYSTOLIC BLOOD PRESSURE: 124 MMHG | WEIGHT: 206.13 LBS | HEIGHT: 66 IN | DIASTOLIC BLOOD PRESSURE: 66 MMHG | HEART RATE: 84 BPM | RESPIRATION RATE: 16 BRPM | BODY MASS INDEX: 33.13 KG/M2 | OXYGEN SATURATION: 98 % | TEMPERATURE: 98.4 F

## 2021-10-14 DIAGNOSIS — J02.0 STREP THROAT: ICD-10-CM

## 2021-10-14 DIAGNOSIS — J02.9 SORE THROAT: ICD-10-CM

## 2021-10-14 DIAGNOSIS — B35.9 RINGWORM: ICD-10-CM

## 2021-10-14 DIAGNOSIS — R05.9 COUGH: ICD-10-CM

## 2021-10-14 LAB
EXTERNAL QUALITY CONTROL: NEGATIVE
INT CON NEG: NORMAL
INT CON POS: NORMAL
S PYO AG THROAT QL: NEGATIVE
SARS-COV+SARS-COV-2 AG RESP QL IA.RAPID: NORMAL

## 2021-10-14 PROCEDURE — 87070 CULTURE OTHR SPECIMN AEROBIC: CPT

## 2021-10-14 PROCEDURE — 99214 OFFICE O/P EST MOD 30 MIN: CPT | Performed by: PHYSICIAN ASSISTANT

## 2021-10-14 PROCEDURE — 87880 STREP A ASSAY W/OPTIC: CPT | Performed by: PHYSICIAN ASSISTANT

## 2021-10-14 PROCEDURE — 87426 SARSCOV CORONAVIRUS AG IA: CPT | Performed by: PHYSICIAN ASSISTANT

## 2021-10-14 PROCEDURE — U0003 INFECTIOUS AGENT DETECTION BY NUCLEIC ACID (DNA OR RNA); SEVERE ACUTE RESPIRATORY SYNDROME CORONAVIRUS 2 (SARS-COV-2) (CORONAVIRUS DISEASE [COVID-19]), AMPLIFIED PROBE TECHNIQUE, MAKING USE OF HIGH THROUGHPUT TECHNOLOGIES AS DESCRIBED BY CMS-2020-01-R: HCPCS

## 2021-10-14 PROCEDURE — U0005 INFEC AGEN DETEC AMPLI PROBE: HCPCS

## 2021-10-14 PROCEDURE — 87077 CULTURE AEROBIC IDENTIFY: CPT

## 2021-10-14 RX ORDER — CLOTRIMAZOLE 1 %
CREAM (GRAM) TOPICAL
Qty: 30 G | Refills: 0 | Status: SHIPPED | OUTPATIENT
Start: 2021-10-14 | End: 2021-11-03

## 2021-10-14 ASSESSMENT — FIBROSIS 4 INDEX: FIB4 SCORE: 0.68

## 2021-10-14 ASSESSMENT — ENCOUNTER SYMPTOMS
COUGH: 1
SORE THROAT: 1

## 2021-10-14 NOTE — PROGRESS NOTES
Subjective:   Syeda Santiago is a 21 y.o. female who presents today with   Chief Complaint   Patient presents with   • Pharyngitis     5 days , rash        Pharyngitis   This is a new problem. Episode onset: 5 days. The problem has been unchanged. There has been no fever. The pain is moderate. Associated symptoms include coughing. Treatments tried: OTC cold meds. The treatment provided no relief.     Patient also notes she has had a rash that is diffusely spread on her chest, back, right upper extremity and left foot.  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  Has been trying hydrocortisone cream with no relief.  PMH:  has a past medical history of Allergy, Anxiety, Depression, Head ache, and Migraine. She also has no past medical history of Addisons disease (HCC), Adrenal disorder (HCC), Anemia, ASTHMA, Blood transfusion without reported diagnosis, Cancer (HCC), Clotting disorder (HCC), Diabetes, GERD (gastroesophageal reflux disease), Heart attack (HCC), Hyperlipidemia, Hypertension, IBD (inflammatory bowel disease), Kidney disease, Seizure (HCC), Substance abuse (HCC), or Thyroid disease.  MEDS:   Current Outpatient Medications:   •  clotrimazole (LOTRIMIN) 1 % Cream, Apply to affected area twice daily for 2 to 4 weeks., Disp: 30 g, Rfl: 0  •  eletriptan (RELPAX) 20 MG Tab, Take 1 Tablet by mouth one time as needed for Migraine for up to 1 dose. May repeat dose in two hours if not resolved. NO MORE THAN 2 TABLETS IN 24 HOUR PERIOD., Disp: 10 Tablet, Rfl: 0  •  hydrOXYzine pamoate (VISTARIL) 50 MG Cap, Take 1 Capsule by mouth 3 times a day as needed for Anxiety., Disp: 90 Capsule, Rfl: 5  •  sertraline (ZOLOFT) 100 MG Tab, Take 1 Tablet by mouth every day., Disp: 90 Each, Rfl: 3  •  hydrOXYzine HCl (ATARAX) 50 MG Tab, Take 1 Tablet by mouth 3 times a day as needed for Anxiety., Disp: 30 Tablet, Rfl: 0  •  sertraline (ZOLOFT) 100 MG Tab, Take 1 Tablet by mouth  "every day., Disp: 15 Tablet, Rfl: 0  •  Cholecalciferol (VITAMIN D3) 2000 UNIT Cap, Take 1 Capsule by mouth every day., Disp: , Rfl:   ALLERGIES:   Allergies   Allergen Reactions   • Pineapple Hives, Itching, Swelling and Cough   • Shellfish Allergy Hives     SURGHX:   Past Surgical History:   Procedure Laterality Date   • SHOULDER ARTHROSCOPY W/ SLAP / LABRAL REPAIR Right 11/2017     SOCHX:  reports that she has never smoked. She has never used smokeless tobacco. She reports that she does not drink alcohol and does not use drugs.  FH: Reviewed with patient, not pertinent to this visit.       Review of Systems   HENT: Positive for sore throat.    Respiratory: Positive for cough.    Skin: Positive for rash.        Objective:   /66   Pulse 84   Temp 36.9 °C (98.4 °F) (Temporal)   Resp 16   Ht 1.676 m (5' 6\")   Wt 93.5 kg (206 lb 2.1 oz)   SpO2 98%   BMI 33.27 kg/m²   Physical Exam  Vitals and nursing note reviewed.   Constitutional:       General: She is not in acute distress.     Appearance: Normal appearance. She is well-developed. She is not ill-appearing or toxic-appearing.   HENT:      Head: Normocephalic and atraumatic.      Right Ear: Hearing normal.      Left Ear: Hearing normal.      Mouth/Throat:      Pharynx: Uvula midline. Posterior oropharyngeal erythema present. No oropharyngeal exudate or uvula swelling.      Tonsils: Tonsillar exudate present. No tonsillar abscesses.   Eyes:      Conjunctiva/sclera: Conjunctivae normal.   Cardiovascular:      Rate and Rhythm: Normal rate and regular rhythm.      Heart sounds: Normal heart sounds.   Pulmonary:      Effort: Pulmonary effort is normal.      Breath sounds: Normal breath sounds.   Musculoskeletal:      Comments: Normal movement in all 4 extremities   Lymphadenopathy:      Cervical: No cervical adenopathy.   Skin:     General: Skin is warm and dry.             Comments: Diffuse patchy rash with centralized clearing.   Neurological:      Mental " Status: She is alert.      Coordination: Coordination normal.   Psychiatric:         Mood and Affect: Mood normal.         STREP A NEG  Assessment/Plan:   Assessment    1. Sore throat  - POCT Rapid Strep A  - CULTURE THROAT; Future  - SARS-CoV-2 PCR (24 hour In-House): Collect NP swab in VTM; Future    2. Cough  - POCT SARS-COV Antigen ESTELLA (Symptomatic Only)  - SARS-CoV-2 PCR (24 hour In-House): Collect NP swab in VTM; Future    3. Ringworm  - clotrimazole (LOTRIMIN) 1 % Cream; Apply to affected area twice daily for 2 to 4 weeks.  Dispense: 30 g; Refill: 0  Symptoms and presentation most consistent with viral illness.  Do see some exudate on the left tonsil therefore will rule out bacterial infection with throat culture.  Rash appears consistent with ringworm and will treat accordingly at this time.  Discussed CDC guidelines including self isolation at home.   Patient encouraged to get plenty of rest, use OTC tylenol for pain/fever, and drink plenty of fluids.    Differential diagnosis, natural history, supportive care, and indications for immediate follow-up discussed.   Patient given instructions and understanding of medications and treatment.    If not improving in 3-5 days, F/U with PCP or return to  if symptoms worsen.    Patient agreeable to plan.  Greater than 30 minutes were spent reviewing patient's chart, examining and obtaining history from patient, and discussing plan of care.       Please note that this dictation was created using voice recognition software. I have made every reasonable attempt to correct obvious errors, but I expect that there are errors of grammar and possibly content that I did not discover before finalizing the note.    Tung Manley PA-C

## 2021-10-15 DIAGNOSIS — R05.9 COUGH: ICD-10-CM

## 2021-10-15 DIAGNOSIS — J02.9 SORE THROAT: ICD-10-CM

## 2021-10-15 LAB
COVID ORDER STATUS COVID19: NORMAL
SARS-COV-2 RNA RESP QL NAA+PROBE: NOTDETECTED
SPECIMEN SOURCE: NORMAL

## 2021-10-17 LAB
BACTERIA SPEC RESP CULT: ABNORMAL
BACTERIA SPEC RESP CULT: ABNORMAL
SIGNIFICANT IND 70042: ABNORMAL
SITE SITE: ABNORMAL
SOURCE SOURCE: ABNORMAL

## 2021-10-17 RX ORDER — AMOXICILLIN 500 MG/1
500 CAPSULE ORAL 2 TIMES DAILY
Qty: 20 CAPSULE | Refills: 0 | Status: SHIPPED | OUTPATIENT
Start: 2021-10-17 | End: 2021-10-27

## 2021-10-27 ENCOUNTER — OFFICE VISIT (OUTPATIENT)
Dept: MEDICAL GROUP | Facility: CLINIC | Age: 21
End: 2021-10-27
Payer: COMMERCIAL

## 2021-10-27 VITALS
SYSTOLIC BLOOD PRESSURE: 110 MMHG | RESPIRATION RATE: 16 BRPM | OXYGEN SATURATION: 99 % | DIASTOLIC BLOOD PRESSURE: 70 MMHG | WEIGHT: 208 LBS | HEIGHT: 66 IN | HEART RATE: 83 BPM | BODY MASS INDEX: 33.43 KG/M2 | TEMPERATURE: 97.9 F

## 2021-10-27 DIAGNOSIS — R21 PITYRIASIS ROSEA-LIKE SKIN ERUPTION: ICD-10-CM

## 2021-10-27 PROCEDURE — 99213 OFFICE O/P EST LOW 20 MIN: CPT | Performed by: PHYSICIAN ASSISTANT

## 2021-10-27 RX ORDER — TRIAMCINOLONE ACETONIDE 0.25 MG/G
CREAM TOPICAL
Qty: 15 G | Refills: 1 | Status: SHIPPED | OUTPATIENT
Start: 2021-10-27 | End: 2022-02-28

## 2021-10-27 RX ORDER — METHYLPREDNISOLONE 4 MG/1
TABLET ORAL
Qty: 21 TABLET | Refills: 0 | Status: SHIPPED | OUTPATIENT
Start: 2021-10-27 | End: 2021-11-03

## 2021-10-27 ASSESSMENT — ENCOUNTER SYMPTOMS
RESPIRATORY NEGATIVE: 1
PSYCHIATRIC NEGATIVE: 1
MUSCULOSKELETAL NEGATIVE: 1
NEUROLOGICAL NEGATIVE: 1
CARDIOVASCULAR NEGATIVE: 1
CONSTITUTIONAL NEGATIVE: 1
EYES NEGATIVE: 1
GASTROINTESTINAL NEGATIVE: 1

## 2021-10-27 ASSESSMENT — VISUAL ACUITY: OU: 1

## 2021-10-27 ASSESSMENT — FIBROSIS 4 INDEX: FIB4 SCORE: 0.68

## 2021-10-28 NOTE — PROGRESS NOTES
Subjective   Syeda Santiago is a 21 y.o. female who presents with Rash (ALL OVER, a month)    1. Pityriasis rosea-like skin eruption  Patient has a pruritic, scaly rash over her chest, back, forearms and tops of her feet.  This is been present for over a month.  Originally started out with over-the-counter hydrocortisone and calamine lotion.  When that did not work for her she went to the urgent care where they told her it was ringworm and gave her Lotrimin cream.  She has been using this for the last week twice a day as directed and states that the rash is getting worse instead of better.  She is here today to see if there is something else that can be done as she is very uncomfortable.  There is no obvious herald patch at this time though she has had the symptoms for over 4 weeks.  Her patches are pink with a white scale over them.  The ones on her back are small but the ones on her chest tend to be larger and more oval in shape.  She does also have lesions on her forearms and the tops of her feet.  This is an atypical distribution but still possible.  We will have her discontinue the antifungal.  I have explained to her that it is likely viral in nature due to the sudden onset of symptoms.  We will give her a Medrol Dosepak to try and help control the pruritus.  We will also try some triamcinolone cream to use on her wrists, forearm and tops of her feet.  She will follow-up in 1 week to check on the progress.  She understands that this will likely not resolve her rash but will make her more comfortable.  - methylPREDNISolone (MEDROL DOSEPAK) 4 MG Tablet Therapy Pack; As directed on the packaging label.  Dispense: 21 Tablet; Refill: 0  - triamcinolone acetonide (KENALOG) 0.025 % Cream; Apply a small amount topically to affected area twice a day.  Dispense: 15 g; Refill: 1    Past Medical History:  No date: Allergy      Comment:  pineapple, seafood (mild)  No date: Anxiety  No date: Depression  No date: Head ache  No  date: Migraine  Past Surgical History:  11/2017: SHOULDER ARTHROSCOPY W/ SLAP / LABRAL REPAIR; Right  Social History    Tobacco Use      Smoking status: Never Smoker      Smokeless tobacco: Never Used    Vaping Use      Vaping Use: Never used    Alcohol use: Never    Drug use: Never    Review of patient's family history indicates:  Problem: Other      Relation: Mother          Age of Onset: (Not Specified)          Comment: Aneurism  Problem: Psychiatric Illness      Relation: Mother          Age of Onset: (Not Specified)          Comment: Anxiety and depression  Problem: Arthritis      Relation: Mother          Age of Onset: (Not Specified)  Problem: Thyroid      Relation: Mother          Age of Onset: (Not Specified)  Problem: No Known Problems      Relation: Father          Age of Onset: (Not Specified)  Problem: Thyroid      Relation: Sister          Age of Onset: (Not Specified)  Problem: Psychiatric Illness      Relation: Sister          Age of Onset: (Not Specified)          Comment: Anxiety and depression  Problem: Psychiatric Illness      Relation: Brother          Age of Onset: (Not Specified)          Comment: Suicide  Problem: Kidney Disease      Relation: Maternal Grandmother          Age of Onset: (Not Specified)  Problem: Cancer      Relation: Maternal Grandfather          Age of Onset: (Not Specified)          Comment: bone, skin  Problem: Heart Attack      Relation: Paternal Grandmother          Age of Onset: (Not Specified)  Problem: Heart Disease      Relation: Paternal Grandmother          Age of Onset: (Not Specified)          Comment: Heart transplant  Problem: Hyperlipidemia      Relation: Paternal Grandmother          Age of Onset: (Not Specified)  Problem: Hypertension      Relation: Paternal Grandmother          Age of Onset: (Not Specified)  Problem: No Known Problems      Relation: Paternal Grandfather          Age of Onset: (Not Specified)  Problem: No Known Problems      Relation:  Brother          Age of Onset: (Not Specified)  Problem: Diabetes      Relation: Neg Hx          Age of Onset: (Not Specified)      Current Outpatient Medications: •  methylPREDNISolone (MEDROL DOSEPAK) 4 MG Tablet Therapy Pack, As directed on the packaging label., Disp: 21 Tablet, Rfl: 0•  triamcinolone acetonide (KENALOG) 0.025 % Cream, Apply a small amount topically to affected area twice a day., Disp: 15 g, Rfl: 1•  clotrimazole (LOTRIMIN) 1 % Cream, Apply to affected area twice daily for 2 to 4 weeks., Disp: 30 g, Rfl: 0•  eletriptan (RELPAX) 20 MG Tab, Take 1 Tablet by mouth one time as needed for Migraine for up to 1 dose. May repeat dose in two hours if not resolved. NO MORE THAN 2 TABLETS IN 24 HOUR PERIOD., Disp: 10 Tablet, Rfl: 0•  sertraline (ZOLOFT) 100 MG Tab, Take 1 Tablet by mouth every day., Disp: 90 Each, Rfl: 3•  Cholecalciferol (VITAMIN D3) 2000 UNIT Cap, Take 1 Capsule by mouth every day., Disp: , Rfl: •  hydrOXYzine pamoate (VISTARIL) 50 MG Cap, Take 1 Capsule by mouth 3 times a day as needed for Anxiety., Disp: 90 Capsule, Rfl: 5•  hydrOXYzine HCl (ATARAX) 50 MG Tab, Take 1 Tablet by mouth 3 times a day as needed for Anxiety., Disp: 30 Tablet, Rfl: 0    Patient was instructed on the use of medications, either prescriptions or OTC and informed on when the appropriate follow up time period should be. In addition, patient was also instructed that should any acute worsening occur that they should notify this clinic asap or call 911.      Review of Systems   Constitutional: Negative.    HENT: Negative.    Eyes: Negative.    Respiratory: Negative.    Cardiovascular: Negative.    Gastrointestinal: Negative.    Genitourinary: Negative.    Musculoskeletal: Negative.    Skin: Positive for itching and rash.   Neurological: Negative.    Endo/Heme/Allergies: Negative.    Psychiatric/Behavioral: Negative.      Objective     /70   Pulse 83   Temp 36.6 °C (97.9 °F) (Temporal)   Resp 16   Ht 1.676 m  "(5' 6\")   Wt 94.3 kg (208 lb)   SpO2 99%   BMI 33.57 kg/m²      Physical Exam  Vitals and nursing note reviewed.   Constitutional:       Appearance: Normal appearance. She is well-developed and well-groomed.   HENT:      Head: Normocephalic and atraumatic.      Nose: Nose normal.      Mouth/Throat:      Lips: Pink. No lesions.      Mouth: Mucous membranes are moist.   Eyes:      General: Lids are normal. Vision grossly intact. Gaze aligned appropriately.      Extraocular Movements: Extraocular movements intact.      Conjunctiva/sclera: Conjunctivae normal.      Pupils: Pupils are equal, round, and reactive to light.   Neck:      Thyroid: No thyromegaly.      Vascular: No carotid bruit or JVD.      Trachea: Trachea and phonation normal.   Cardiovascular:      Rate and Rhythm: Normal rate and regular rhythm.      Heart sounds: Normal heart sounds. No murmur heard.   No friction rub. No gallop.    Pulmonary:      Effort: Pulmonary effort is normal.      Breath sounds: Normal breath sounds. No wheezing, rhonchi or rales.   Musculoskeletal:         General: Normal range of motion.      Cervical back: Normal range of motion and neck supple.      Right lower leg: No edema.      Left lower leg: No edema.   Lymphadenopathy:      Cervical: No cervical adenopathy.   Skin:     General: Skin is warm and dry.      Capillary Refill: Capillary refill takes less than 2 seconds.      Findings: Rash present. No lesion. Rash is scaling.             Comments: Round to oval shape dark pink patches with white scale.  Smaller patches on her back, larger patches on her chest and lower neck.  Moderate sized patch on the top of her left foot as well as her right wrist and forearm.   Neurological:      Mental Status: She is alert and oriented to person, place, and time.      Cranial Nerves: Cranial nerves are intact.   Psychiatric:         Attention and Perception: Attention and perception normal.         Mood and Affect: Mood and affect " normal.         Speech: Speech normal.         Behavior: Behavior normal. Behavior is cooperative.         Thought Content: Thought content normal.         Judgment: Judgment normal.       Assessment & Plan      1. Pityriasis rosea-like skin eruption  - methylPREDNISolone (MEDROL DOSEPAK) 4 MG Tablet Therapy Pack; As directed on the packaging label.  Dispense: 21 Tablet; Refill: 0  - triamcinolone acetonide (KENALOG) 0.025 % Cream; Apply a small amount topically to affected area twice a day.  Dispense: 15 g; Refill: 1

## 2021-11-03 ENCOUNTER — OFFICE VISIT (OUTPATIENT)
Dept: MEDICAL GROUP | Facility: CLINIC | Age: 21
End: 2021-11-03
Payer: COMMERCIAL

## 2021-11-03 VITALS
TEMPERATURE: 97.6 F | RESPIRATION RATE: 16 BRPM | SYSTOLIC BLOOD PRESSURE: 108 MMHG | HEIGHT: 66 IN | HEART RATE: 64 BPM | WEIGHT: 208 LBS | BODY MASS INDEX: 33.43 KG/M2 | DIASTOLIC BLOOD PRESSURE: 68 MMHG | OXYGEN SATURATION: 95 %

## 2021-11-03 DIAGNOSIS — R21 PITYRIASIS ROSEA-LIKE SKIN ERUPTION: ICD-10-CM

## 2021-11-03 PROCEDURE — 99213 OFFICE O/P EST LOW 20 MIN: CPT | Performed by: PHYSICIAN ASSISTANT

## 2021-11-03 ASSESSMENT — ENCOUNTER SYMPTOMS
MUSCULOSKELETAL NEGATIVE: 1
NEUROLOGICAL NEGATIVE: 1
CONSTITUTIONAL NEGATIVE: 1
RESPIRATORY NEGATIVE: 1
EYES NEGATIVE: 1
CARDIOVASCULAR NEGATIVE: 1
GASTROINTESTINAL NEGATIVE: 1
PSYCHIATRIC NEGATIVE: 1

## 2021-11-03 ASSESSMENT — VISUAL ACUITY: OU: 1

## 2021-11-03 ASSESSMENT — FIBROSIS 4 INDEX: FIB4 SCORE: 0.68

## 2021-11-03 NOTE — PROGRESS NOTES
Subjective   Syeda Santiago is a 21 y.o. female who presents with Medication Management (fv)    1. Pityriasis rosea-like skin eruption  Patient here today to follow-up on her pityriasis rosea-like rash.  She states that the steroid taper helped tremendously with the itching along with the steroid cream.  She states that the rash is starting to lighten and is just about gone across to her chest and neck.  She states the one on her wrist and on her foot are both smaller.  I explained to her that this is usually a self-limiting process that should resolve in 6 to 8 weeks.  She has been instructed to return to the clinic should she have any change in her symptoms or any additional problems patient is agreeable to this plan of action.  We will follow-up with her in about a month to reassess her skin.    Past Medical History:  No date: Allergy      Comment:  pineapple, seafood (mild)  No date: Anxiety  No date: Depression  No date: Head ache  No date: Migraine  Past Surgical History:  11/2017: SHOULDER ARTHROSCOPY W/ SLAP / LABRAL REPAIR; Right  Social History    Tobacco Use      Smoking status: Never Smoker      Smokeless tobacco: Never Used    Vaping Use      Vaping Use: Never used    Alcohol use: Never    Drug use: Never    Review of patient's family history indicates:  Problem: Other      Relation: Mother          Age of Onset: (Not Specified)          Comment: Aneurism  Problem: Psychiatric Illness      Relation: Mother          Age of Onset: (Not Specified)          Comment: Anxiety and depression  Problem: Arthritis      Relation: Mother          Age of Onset: (Not Specified)  Problem: Thyroid      Relation: Mother          Age of Onset: (Not Specified)  Problem: No Known Problems      Relation: Father          Age of Onset: (Not Specified)  Problem: Thyroid      Relation: Sister          Age of Onset: (Not Specified)  Problem: Psychiatric Illness      Relation: Sister          Age of Onset: (Not Specified)           Comment: Anxiety and depression  Problem: Psychiatric Illness      Relation: Brother          Age of Onset: (Not Specified)          Comment: Suicide  Problem: Kidney Disease      Relation: Maternal Grandmother          Age of Onset: (Not Specified)  Problem: Cancer      Relation: Maternal Grandfather          Age of Onset: (Not Specified)          Comment: bone, skin  Problem: Heart Attack      Relation: Paternal Grandmother          Age of Onset: (Not Specified)  Problem: Heart Disease      Relation: Paternal Grandmother          Age of Onset: (Not Specified)          Comment: Heart transplant  Problem: Hyperlipidemia      Relation: Paternal Grandmother          Age of Onset: (Not Specified)  Problem: Hypertension      Relation: Paternal Grandmother          Age of Onset: (Not Specified)  Problem: No Known Problems      Relation: Paternal Grandfather          Age of Onset: (Not Specified)  Problem: No Known Problems      Relation: Brother          Age of Onset: (Not Specified)  Problem: Diabetes      Relation: Neg Hx          Age of Onset: (Not Specified)      Current Outpatient Medications: •  triamcinolone acetonide (KENALOG) 0.025 % Cream, Apply a small amount topically to affected area twice a day., Disp: 15 g, Rfl: 1•  eletriptan (RELPAX) 20 MG Tab, Take 1 Tablet by mouth one time as needed for Migraine for up to 1 dose. May repeat dose in two hours if not resolved. NO MORE THAN 2 TABLETS IN 24 HOUR PERIOD., Disp: 10 Tablet, Rfl: 0•  hydrOXYzine pamoate (VISTARIL) 50 MG Cap, Take 1 Capsule by mouth 3 times a day as needed for Anxiety., Disp: 90 Capsule, Rfl: 5•  sertraline (ZOLOFT) 100 MG Tab, Take 1 Tablet by mouth every day., Disp: 90 Each, Rfl: 3•  Cholecalciferol (VITAMIN D3) 2000 UNIT Cap, Take 1 Capsule by mouth every day., Disp: , Rfl:     Patient was instructed on the use of medications, either prescriptions or OTC and informed on when the appropriate follow up time period should be. In addition,  "patient was also instructed that should any acute worsening occur that they should notify this clinic asap or call 911.      Review of Systems   Constitutional: Negative.    HENT: Negative.    Eyes: Negative.    Respiratory: Negative.    Cardiovascular: Negative.    Gastrointestinal: Negative.    Genitourinary: Negative.    Musculoskeletal: Negative.    Skin: Positive for itching and rash.   Neurological: Negative.    Endo/Heme/Allergies: Negative.    Psychiatric/Behavioral: Negative.      Objective     /68 (BP Location: Left arm, Patient Position: Sitting, BP Cuff Size: Large adult)   Pulse 64   Temp 36.4 °C (97.6 °F) (Temporal)   Resp 16   Ht 1.676 m (5' 6\") Comment: stated by pt  Wt 94.3 kg (208 lb) Comment: with shoes on  SpO2 95%   BMI 33.57 kg/m²      Physical Exam  Vitals and nursing note reviewed.   Constitutional:       Appearance: Normal appearance. She is well-developed and well-groomed.   HENT:      Head: Normocephalic and atraumatic.      Nose: Nose normal.      Mouth/Throat:      Lips: Pink. No lesions.      Mouth: Mucous membranes are moist.   Eyes:      General: Lids are normal. Vision grossly intact. Gaze aligned appropriately.      Extraocular Movements: Extraocular movements intact.      Conjunctiva/sclera: Conjunctivae normal.      Pupils: Pupils are equal, round, and reactive to light.   Neck:      Thyroid: No thyromegaly.      Vascular: No carotid bruit or JVD.      Trachea: Trachea and phonation normal.   Cardiovascular:      Rate and Rhythm: Normal rate and regular rhythm.      Heart sounds: Normal heart sounds. No murmur heard.   No friction rub. No gallop.    Pulmonary:      Effort: Pulmonary effort is normal.      Breath sounds: Normal breath sounds. No wheezing, rhonchi or rales.   Musculoskeletal:         General: Normal range of motion.      Cervical back: Normal range of motion and neck supple.      Right lower leg: No edema.      Left lower leg: No edema. "   Lymphadenopathy:      Cervical: No cervical adenopathy.   Skin:     General: Skin is warm and dry.      Capillary Refill: Capillary refill takes less than 2 seconds.      Findings: Rash present. No lesion.   Neurological:      Mental Status: She is alert and oriented to person, place, and time.      Cranial Nerves: Cranial nerves are intact.   Psychiatric:         Attention and Perception: Attention and perception normal.         Mood and Affect: Mood and affect normal.         Speech: Speech normal.         Behavior: Behavior normal. Behavior is cooperative.         Thought Content: Thought content normal.         Judgment: Judgment normal.       Assessment & Plan      1. Pityriasis rosea-like skin eruption

## 2021-12-28 ENCOUNTER — OFFICE VISIT (OUTPATIENT)
Dept: MEDICAL GROUP | Facility: CLINIC | Age: 21
End: 2021-12-28
Payer: COMMERCIAL

## 2021-12-28 VITALS
BODY MASS INDEX: 33.4 KG/M2 | HEART RATE: 71 BPM | DIASTOLIC BLOOD PRESSURE: 76 MMHG | HEIGHT: 66 IN | WEIGHT: 207.8 LBS | TEMPERATURE: 97.8 F | OXYGEN SATURATION: 96 % | SYSTOLIC BLOOD PRESSURE: 114 MMHG

## 2021-12-28 DIAGNOSIS — R21 PITYRIASIS ROSEA-LIKE SKIN ERUPTION: ICD-10-CM

## 2021-12-28 DIAGNOSIS — G43.109 MIGRAINE WITH AURA AND WITHOUT STATUS MIGRAINOSUS, NOT INTRACTABLE: ICD-10-CM

## 2021-12-28 PROCEDURE — 99214 OFFICE O/P EST MOD 30 MIN: CPT | Performed by: PHYSICIAN ASSISTANT

## 2021-12-28 RX ORDER — PREDNISONE 20 MG/1
40 TABLET ORAL DAILY
Qty: 30 TABLET | Refills: 0 | Status: SHIPPED | OUTPATIENT
Start: 2021-12-28 | End: 2022-02-28

## 2021-12-28 ASSESSMENT — FIBROSIS 4 INDEX: FIB4 SCORE: 0.68

## 2022-01-03 ASSESSMENT — ENCOUNTER SYMPTOMS
EYES NEGATIVE: 1
RESPIRATORY NEGATIVE: 1
CONSTITUTIONAL NEGATIVE: 1
LOSS OF CONSCIOUSNESS: 0
GASTROINTESTINAL NEGATIVE: 1
SEIZURES: 0
MUSCULOSKELETAL NEGATIVE: 1
WEAKNESS: 0
HEADACHES: 1
CARDIOVASCULAR NEGATIVE: 1
TREMORS: 0
SPEECH CHANGE: 0
DIZZINESS: 0
TINGLING: 0
PSYCHIATRIC NEGATIVE: 1

## 2022-01-03 ASSESSMENT — VISUAL ACUITY: OU: 1

## 2022-01-03 NOTE — PROGRESS NOTES
Subjective   Syeda Santiago is a 21 y.o. female who presents with Medication Management (FV on skin medication)    1. Migraine with aura and without status migrainosus, not intractable  Patient states that she took the maxalt for her first migraine and it went away with one dose. The second migraine it took two doses to relieve the pain. Every since then the medication does not work on her migraines at all. We discontinued that medication and started her on eletriptan. She states that this one does not work at all. States that she is getting 5-10 migraines a month. Cannot identify any triggers. Will place a referral to neurology for further evaluation and treatment options.              Referral to Neurology    2. Pityriasis rosea-like skin eruption  Patient states that the cream that she was given at last visit did not help with the itching and that the rash is getting worse again. We will give her a short course of steroid to see if this helps resolve the rash. If it does not help she will get a referral to dermatology for further evaluation.   predniSONE (DELTASONE) 20 MG Tab; Take 2 Tablets by mouth every day.  Dispense: 30 Tablet; Refill: 0    Past Medical History:  No date: Allergy      Comment:  pineapple, seafood (mild)  No date: Anxiety  No date: Depression  No date: Head ache  No date: Migraine  Past Surgical History:  11/2017: SHOULDER ARTHROSCOPY W/ SLAP / LABRAL REPAIR; Right  Social History    Tobacco Use      Smoking status: Never Smoker      Smokeless tobacco: Never Used    Vaping Use      Vaping Use: Never used    Alcohol use: Never    Drug use: Never    Review of patient's family history indicates:  Problem: Other      Relation: Mother          Age of Onset: (Not Specified)          Comment: Aneurism  Problem: Psychiatric Illness      Relation: Mother          Age of Onset: (Not Specified)          Comment: Anxiety and depression  Problem: Arthritis      Relation: Mother          Age of Onset: (Not  Specified)  Problem: Thyroid      Relation: Mother          Age of Onset: (Not Specified)  Problem: No Known Problems      Relation: Father          Age of Onset: (Not Specified)  Problem: Thyroid      Relation: Sister          Age of Onset: (Not Specified)  Problem: Psychiatric Illness      Relation: Sister          Age of Onset: (Not Specified)          Comment: Anxiety and depression  Problem: Psychiatric Illness      Relation: Brother          Age of Onset: (Not Specified)          Comment: Suicide  Problem: Kidney Disease      Relation: Maternal Grandmother          Age of Onset: (Not Specified)  Problem: Cancer      Relation: Maternal Grandfather          Age of Onset: (Not Specified)          Comment: bone, skin  Problem: Heart Attack      Relation: Paternal Grandmother          Age of Onset: (Not Specified)  Problem: Heart Disease      Relation: Paternal Grandmother          Age of Onset: (Not Specified)          Comment: Heart transplant  Problem: Hyperlipidemia      Relation: Paternal Grandmother          Age of Onset: (Not Specified)  Problem: Hypertension      Relation: Paternal Grandmother          Age of Onset: (Not Specified)  Problem: No Known Problems      Relation: Paternal Grandfather          Age of Onset: (Not Specified)  Problem: No Known Problems      Relation: Brother          Age of Onset: (Not Specified)  Problem: Diabetes      Relation: Neg Hx          Age of Onset: (Not Specified)      Current Outpatient Medications: •  predniSONE (DELTASONE) 20 MG Tab, Take 2 Tablets by mouth every day., Disp: 30 Tablet, Rfl: 0•  triamcinolone acetonide (KENALOG) 0.025 % Cream, Apply a small amount topically to affected area twice a day., Disp: 15 g, Rfl: 1•  eletriptan (RELPAX) 20 MG Tab, Take 1 Tablet by mouth one time as needed for Migraine for up to 1 dose. May repeat dose in two hours if not resolved. NO MORE THAN 2 TABLETS IN 24 HOUR PERIOD., Disp: 10 Tablet, Rfl: 0•  hydrOXYzine pamoate (VISTARIL)  "50 MG Cap, Take 1 Capsule by mouth 3 times a day as needed for Anxiety., Disp: 90 Capsule, Rfl: 5•  sertraline (ZOLOFT) 100 MG Tab, Take 1 Tablet by mouth every day., Disp: 90 Each, Rfl: 3•  Cholecalciferol (VITAMIN D3) 2000 UNIT Cap, Take 1 Capsule by mouth every day., Disp: , Rfl:     Patient was instructed on the use of medications, either prescriptions or OTC and informed on when the appropriate follow up time period should be. In addition, patient was also instructed that should any acute worsening occur that they should notify this clinic asap or call 911.      Review of Systems   Constitutional: Negative.    HENT: Negative.    Eyes: Negative.    Respiratory: Negative.    Cardiovascular: Negative.    Gastrointestinal: Negative.    Genitourinary: Negative.    Musculoskeletal: Negative.    Skin: Positive for itching and rash.   Neurological: Positive for headaches. Negative for dizziness, tingling, tremors, speech change, seizures, loss of consciousness and weakness.   Endo/Heme/Allergies: Negative.    Psychiatric/Behavioral: Negative.      Objective     /76 (BP Location: Left arm, Patient Position: Sitting, BP Cuff Size: Adult long)   Pulse 71   Temp 36.6 °C (97.8 °F) (Temporal)   Ht 1.676 m (5' 6\")   Wt 94.3 kg (207 lb 12.8 oz)   LMP 12/01/2020 (Approximate)   SpO2 96%   BMI 33.54 kg/m²      Physical Exam  Vitals and nursing note reviewed.   Constitutional:       Appearance: Normal appearance. She is well-developed and well-groomed.   HENT:      Head: Normocephalic and atraumatic.      Nose: Nose normal.      Mouth/Throat:      Lips: Pink. No lesions.      Mouth: Mucous membranes are moist.   Eyes:      General: Lids are normal. Vision grossly intact. Gaze aligned appropriately.      Extraocular Movements: Extraocular movements intact.      Conjunctiva/sclera: Conjunctivae normal.      Pupils: Pupils are equal, round, and reactive to light.   Neck:      Thyroid: No thyromegaly.      Vascular: No " carotid bruit or JVD.      Trachea: Trachea and phonation normal.   Cardiovascular:      Rate and Rhythm: Normal rate and regular rhythm.      Heart sounds: Normal heart sounds. No murmur heard.  No friction rub. No gallop.    Pulmonary:      Effort: Pulmonary effort is normal.      Breath sounds: Normal breath sounds. No wheezing, rhonchi or rales.   Musculoskeletal:         General: Normal range of motion.      Cervical back: Normal range of motion and neck supple.      Right lower leg: No edema.      Left lower leg: No edema.   Lymphadenopathy:      Cervical: No cervical adenopathy.   Skin:     General: Skin is warm and dry.      Capillary Refill: Capillary refill takes less than 2 seconds.      Findings: Rash present. No lesion. Rash is macular and scaling.      Comments: Rash is across the chest, down both arms and hands, across the entire back and on the top of the left foot.   Neurological:      Mental Status: She is alert and oriented to person, place, and time.      Cranial Nerves: Cranial nerves are intact.   Psychiatric:         Attention and Perception: Attention and perception normal.         Mood and Affect: Mood and affect normal.         Speech: Speech normal.         Behavior: Behavior normal. Behavior is cooperative.         Thought Content: Thought content normal.         Judgment: Judgment normal.       Assessment & Plan      1. Migraine with aura and without status migrainosus, not intractable  - Referral to Neurology    2. Pityriasis rosea-like skin eruption  - predniSONE (DELTASONE) 20 MG Tab; Take 2 Tablets by mouth every day.  Dispense: 30 Tablet; Refill: 0

## 2022-02-28 ENCOUNTER — APPOINTMENT (RX ONLY)
Dept: URBAN - METROPOLITAN AREA CLINIC 22 | Facility: CLINIC | Age: 22
Setting detail: DERMATOLOGY
End: 2022-02-28

## 2022-02-28 ENCOUNTER — OFFICE VISIT (OUTPATIENT)
Dept: NEUROLOGY | Facility: MEDICAL CENTER | Age: 22
End: 2022-02-28
Attending: PSYCHIATRY & NEUROLOGY
Payer: COMMERCIAL

## 2022-02-28 VITALS
RESPIRATION RATE: 16 BRPM | HEIGHT: 66 IN | HEART RATE: 90 BPM | BODY MASS INDEX: 33.8 KG/M2 | WEIGHT: 210.32 LBS | SYSTOLIC BLOOD PRESSURE: 130 MMHG | TEMPERATURE: 98.8 F | OXYGEN SATURATION: 97 % | DIASTOLIC BLOOD PRESSURE: 52 MMHG

## 2022-02-28 DIAGNOSIS — L43.8 OTHER LICHEN PLANUS: ICD-10-CM

## 2022-02-28 DIAGNOSIS — G43.109 MIGRAINE WITH AURA AND WITHOUT STATUS MIGRAINOSUS, NOT INTRACTABLE: Primary | ICD-10-CM

## 2022-02-28 DIAGNOSIS — L30.9 DERMATITIS, UNSPECIFIED: ICD-10-CM

## 2022-02-28 PROBLEM — F32.A DEPRESSION: Status: ACTIVE | Noted: 2021-05-12

## 2022-02-28 PROCEDURE — 99204 OFFICE O/P NEW MOD 45 MIN: CPT | Performed by: PSYCHIATRY & NEUROLOGY

## 2022-02-28 PROCEDURE — ? COUNSELING

## 2022-02-28 PROCEDURE — ? BIOPSY BY SHAVE METHOD

## 2022-02-28 PROCEDURE — 11102 TANGNTL BX SKIN SINGLE LES: CPT

## 2022-02-28 PROCEDURE — 99212 OFFICE O/P EST SF 10 MIN: CPT | Performed by: PSYCHIATRY & NEUROLOGY

## 2022-02-28 PROCEDURE — ? ADDITIONAL NOTES

## 2022-02-28 PROCEDURE — 99204 OFFICE O/P NEW MOD 45 MIN: CPT | Mod: 25

## 2022-02-28 PROCEDURE — ? PRESCRIPTION

## 2022-02-28 RX ORDER — CLOBETASOL PROPIONATE 0.5 MG/G
1 CREAM TOPICAL BID
Qty: 60 | Refills: 3 | Status: ERX | COMMUNITY
Start: 2022-02-28

## 2022-02-28 RX ORDER — PROPRANOLOL HYDROCHLORIDE 20 MG/1
TABLET ORAL
Qty: 113 TABLET | Refills: 0 | Status: SHIPPED | OUTPATIENT
Start: 2022-02-28 | End: 2022-04-29

## 2022-02-28 RX ORDER — RIMEGEPANT SULFATE 75 MG/75MG
75 TABLET, ORALLY DISINTEGRATING ORAL
Qty: 8 TABLET | Refills: 5 | Status: SHIPPED | OUTPATIENT
Start: 2022-02-28 | End: 2022-03-30

## 2022-02-28 RX ORDER — SUMATRIPTAN 20 MG/1
SPRAY NASAL
COMMUNITY
Start: 2022-01-31 | End: 2022-02-28

## 2022-02-28 RX ADMIN — CLOBETASOL PROPIONATE 1: 0.5 CREAM TOPICAL at 00:00

## 2022-02-28 ASSESSMENT — LOCATION ZONE DERM: LOCATION ZONE: ARM

## 2022-02-28 ASSESSMENT — LOCATION DETAILED DESCRIPTION DERM: LOCATION DETAILED: LEFT VENTRAL DISTAL FOREARM

## 2022-02-28 ASSESSMENT — PATIENT HEALTH QUESTIONNAIRE - PHQ9
SUM OF ALL RESPONSES TO PHQ QUESTIONS 1-9: 12
CLINICAL INTERPRETATION OF PHQ2 SCORE: 2
5. POOR APPETITE OR OVEREATING: 1 - SEVERAL DAYS

## 2022-02-28 ASSESSMENT — FIBROSIS 4 INDEX: FIB4 SCORE: 0.71

## 2022-02-28 ASSESSMENT — LOCATION SIMPLE DESCRIPTION DERM: LOCATION SIMPLE: LEFT FOREARM

## 2022-02-28 NOTE — PROGRESS NOTES
"[facility] NEUROLOGY  GENERAL NEUROLOGY  NEW PATIENT VISIT    Referral source: Shelley Fraga PA-C    CC: 'migraine with aura...\"    HISTORY OF ILLNESS:  Syeda Santiago is a 22 y.o. woman with a history most notable for migraine with aura, obesity, MAXIM, and MDD.  Today, she was unaccompanied, and she provided the following history:    The following is a summary of headache symptoms, presented in my standard format:    Family History: mother and sister have headaches/migraines  Age at onset: 15-16  Location: right retro-orbital, left retro-orbital, sub-occipital  Radiation: none  Frequency: 1-3 headaches/week  Duration: hours (6-12) to days  Headache Days/Month: 17/30  Quality: \"ice pick, stabbing\"  Intensity: 8.5/10  Aura: often present upon awakening, if onset during day fatigue, light sensitivity  Photophobia/Phonophobia/Nausea/Vomiting: yes/yes/yes/occasaionlly  Provoked by Physical Activity?: no  Triggers: sleep deprivation, hunger  Associated Symptoms:   Autonomic Signs (such as ptosis, miosis, conjunctival injection, rhinorrhea, increased lacrimation): right eyelid droopiness  Head Trauma: none  Association with Menses: IUD, no period  ED Visits: none, went to  in 1/2022  Hospitalizations: no  Missed Work Days: (), occasionally misses work  Sleep: 6-8 hours/night  Caffeine Intake: no coffee, 2 energy drinks/week  Hydration: keeps well hydrated  Nutrition: skips meals  Exercise:   Analgesic Overuse:     Current Medication Regimen:  - sumatriptan nasal spray: ineffective lately    Medications Tried: Response  Preventive:  -     Abortive:  - eletriptan: lost effectiveness  - rizatriptan: lost effectiveness    Medications Not Tried:  - tricyclic antidepressants: would like to avoid med-med interaction w/ sertraline  - venlafaxine: would like to avoid med-med interaction w/ sertraline    MEDICAL AND SURGICAL HISTORY:  Past Medical History:   Diagnosis Date   • Allergy     pineapple, seafood (mild) "   • Anxiety    • Depression    • Head ache    • Migraine      Past Surgical History:   Procedure Laterality Date   • SHOULDER ARTHROSCOPY W/ SLAP / LABRAL REPAIR Right 11/2017     MEDICATIONS:  Current Outpatient Medications   Medication Sig   • benzoyl peroxide 5 % gel APPLY 1 GRAM TOPICALLY TO THE AFFECTED AREA DAILY. WASH HANDS AFTER APPLICATION.   • propranolol (INDERAL) 20 MG Tab Take 1 Tablet by mouth every evening for 7 days, THEN 1 Tablet 2 times a day for 53 days.   • Rimegepant Sulfate (NURTEC) 75 MG TABLET DISPERSIBLE Take 1 Tablet by mouth 1 time a day as needed for up to 30 days.   • hydrOXYzine pamoate (VISTARIL) 50 MG Cap Take 1 Capsule by mouth 3 times a day as needed for Anxiety.   • sertraline (ZOLOFT) 100 MG Tab Take 1 Tablet by mouth every day.   • Cholecalciferol (VITAMIN D3) 2000 UNIT Cap Take 1 Capsule by mouth every day.     SOCIAL HISTORY:  Social History     Tobacco Use   • Smoking status: Never Smoker   • Smokeless tobacco: Never Used   Substance Use Topics   • Alcohol use: Never     Social History     Social History Narrative   • Not on file     FAMILY HISTORY:  Family History   Problem Relation Age of Onset   • Other Mother         Aneurism   • Psychiatric Illness Mother         Anxiety and depression   • Arthritis Mother    • Thyroid Mother    • No Known Problems Father    • Thyroid Sister    • Psychiatric Illness Sister         Anxiety and depression   • Psychiatric Illness Brother         Suicide   • Kidney Disease Maternal Grandmother    • Cancer Maternal Grandfather         bone, skin   • Heart Attack Paternal Grandmother    • Heart Disease Paternal Grandmother         Heart transplant   • Hyperlipidemia Paternal Grandmother    • Hypertension Paternal Grandmother    • No Known Problems Paternal Grandfather    • No Known Problems Brother    • Diabetes Neg Hx      REVIEW OF SYSTEMS:  A ROS was completed.  Pertinent positives and negatives were included in the HPI, above.  All other  systems were reviewed and are negative.    PHYSICAL EXAM:  General/Medical:  - NAD  - hair, skin, nails, and joints were normal    Neuro:  MENTAL STATUS: awake and alert; no deficits of speech or language; oriented to person, place, and time; affect was appropriate to situation    CRANIAL NERVES:    II: acuity: J1+/J1+ with glasses, fields: intact to confrontation, pupils: 3/3 to 2/2 without a relative afferent pupillary defect, discs: sharp    III/IV/VI: versions: intact without nystagmus    V: facial sensation: symmetric to light touch    VII: facial expression: symmetric    VIII: hearing: intact to voice    IX/X: palate: elevates symmetrically    XI: shoulder shrug: symmetric    XII: tongue: midline    MOTOR:  - bulk: normal throughout  - tone: NT  Upper Extremity Strength  (R/L)    5/5   Elbow flexion 5/5   Elbow extension 5/5   Shoulder abduction 5/5     Lower Extremity Strength  (R/L)   Hip flexion 5/5   Knee extension 5/5   Knee flexion 5/5   Ankle plantarflexion 5/5   Ankle dorsiflexion 5/5     - pronator drift: NT  - abnormal movements: none    SENSATION:  - light touch: grossly intact over the upper and lower extremities  - vibration (R/L, seconds): NT/NT at the great toes  - pinprick: NT  - proprioception: NT  - Romberg: absent    COORDINATION:  - finger to nose: NT  - finger tapping: NT    REFLEXES:  Reflex Right Left   BR NT NT   Biceps NT NT   Triceps NT NT   Patellae NT NT   Achilles NT NT   Toes NT NT     GAIT:  - narrow base and normal  - heel-raised/toe-raised gait: NT/NT  - tandem gait: NT    REVIEW OF IMAGING STUDIES:  No brain imaging available for review.    REVIEW OF LABORATORY STUDIES:  No recent data available for review.    ASSESSMENT:  Syeda Santiago is a 22 y.o. woman with migraine with aura.  Plans/recommendations as follows:    PLAN:  Migraine w/ Aura:  Prevention:  - start propranolol with a goal dosage of 20 mg BID; most common side effects discussed  - try supplementing:  -  magnesium: 400-600 mg once or 200-300 mg twice daily  - riboflavin (vitamin B2): 400 mg once daily  - coenzyme Q10: 300 mg daily  - get 7-9 hours of sleep per night; can try supplementing melatonin 2-10 mg, 2-3 hours before bedtime  - drink plenty of fluids (urine should be nearly clear)  - avoid excessive caffeine intake (no more than 2 servings per day and nothing in the afternoon)  - eat regular meals (don't skip meals)  - get moderate exercise (even just a 20 minute walk daily)    Rescue:  - Nurtec 75 mg: take 1 tablet (75 mg) at the onset of aura/headache; limit 1 tablet (75 mg) in 24 hours; try to limit Nurtec to 1 tablet every 48 hours; do not dose Nurtec and any triptan within 24-hours of one another  - she has failed several first-line rescue agents (listed above)  - will discuss anti-emetics at the next visit  - do not use analgesics (e.g., ibuprofen, acetaminophen) more than 2 days per week in order to avoid analgesic rebound headaches    - keep a headache log    Follow-Up:  - Return in about 2 months (around 4/28/2022).    Signed: Tyler Dolan M.D.

## 2022-02-28 NOTE — PROCEDURE: ADDITIONAL NOTES
Render Risk Assessment In Note?: no
Detail Level: Simple
Additional Notes: Approx 6 months of rash.  Very itchy at times.   Was given triamcinolone that did not really help.   \\nNoted koebnerization, appearance of rash most consistent today with lichen planus. \\nDenices oral or genital lesions.  \\nAdvised otc Zyrtec 10mg 1-2 tablets daily for itching.

## 2022-02-28 NOTE — HPI: RASH
What Type Of Note Output Would You Prefer (Optional)?: Bullet Format
How Severe Is Your Rash?: moderate
Is This A New Presentation, Or A Follow-Up?: Rash
Additional History: Patient reports that this began in September.  She has done 2 prednisone tapers, and was prescribed clotrimazole.  She is a  and they thought it was ring worm.  She does report being sick around June or July 2021. She does have a family history of psoriasis.

## 2022-03-02 ENCOUNTER — TELEPHONE (OUTPATIENT)
Dept: NEUROLOGY | Facility: MEDICAL CENTER | Age: 22
End: 2022-03-02

## 2022-03-02 NOTE — TELEPHONE ENCOUNTER
NURTEC 75mg Dispersible Tablet    PA submitted via CMM  (Key: [id number]) awaiting response TAT 24-72 hrs. - 03/02/2022 7:51am

## 2022-03-25 ENCOUNTER — OFFICE VISIT (OUTPATIENT)
Dept: URGENT CARE | Facility: PHYSICIAN GROUP | Age: 22
End: 2022-03-25
Payer: COMMERCIAL

## 2022-03-25 ENCOUNTER — HOSPITAL ENCOUNTER (OUTPATIENT)
Facility: MEDICAL CENTER | Age: 22
End: 2022-03-25
Attending: NURSE PRACTITIONER
Payer: COMMERCIAL

## 2022-03-25 ENCOUNTER — APPOINTMENT (RX ONLY)
Dept: URBAN - METROPOLITAN AREA CLINIC 22 | Facility: CLINIC | Age: 22
Setting detail: DERMATOLOGY
End: 2022-03-25

## 2022-03-25 VITALS
HEIGHT: 66 IN | OXYGEN SATURATION: 97 % | SYSTOLIC BLOOD PRESSURE: 120 MMHG | RESPIRATION RATE: 17 BRPM | TEMPERATURE: 97.7 F | DIASTOLIC BLOOD PRESSURE: 86 MMHG | HEART RATE: 97 BPM | BODY MASS INDEX: 32.78 KG/M2 | WEIGHT: 204 LBS

## 2022-03-25 DIAGNOSIS — R31.1 BENIGN ESSENTIAL MICROSCOPIC HEMATURIA: ICD-10-CM

## 2022-03-25 DIAGNOSIS — G43.109 MIGRAINE WITH AURA AND WITHOUT STATUS MIGRAINOSUS, NOT INTRACTABLE: ICD-10-CM

## 2022-03-25 DIAGNOSIS — L43.8 OTHER LICHEN PLANUS: ICD-10-CM

## 2022-03-25 DIAGNOSIS — R11.0 NAUSEA: ICD-10-CM

## 2022-03-25 DIAGNOSIS — N30.01 ACUTE CYSTITIS WITH HEMATURIA: ICD-10-CM

## 2022-03-25 LAB
APPEARANCE UR: NORMAL
BILIRUB UR STRIP-MCNC: NEGATIVE MG/DL
COLOR UR AUTO: YELLOW
GLUCOSE UR STRIP.AUTO-MCNC: NEGATIVE MG/DL
INT CON NEG: NORMAL
INT CON POS: NORMAL
KETONES UR STRIP.AUTO-MCNC: NEGATIVE MG/DL
LEUKOCYTE ESTERASE UR QL STRIP.AUTO: NORMAL
NITRITE UR QL STRIP.AUTO: POSITIVE
PH UR STRIP.AUTO: 7.5 [PH] (ref 5–8)
POC URINE PREGNANCY TEST: NEGATIVE
PROT UR QL STRIP: NORMAL MG/DL
RBC UR QL AUTO: NORMAL
SP GR UR STRIP.AUTO: 1.02
UROBILINOGEN UR STRIP-MCNC: 0.2 MG/DL

## 2022-03-25 PROCEDURE — 81025 URINE PREGNANCY TEST: CPT | Performed by: NURSE PRACTITIONER

## 2022-03-25 PROCEDURE — 99213 OFFICE O/P EST LOW 20 MIN: CPT | Performed by: NURSE PRACTITIONER

## 2022-03-25 PROCEDURE — 99214 OFFICE O/P EST MOD 30 MIN: CPT

## 2022-03-25 PROCEDURE — ? DIAGNOSIS COMMENT

## 2022-03-25 PROCEDURE — 87186 SC STD MICRODIL/AGAR DIL: CPT

## 2022-03-25 PROCEDURE — ? PRESCRIPTION

## 2022-03-25 PROCEDURE — ? ADDITIONAL NOTES

## 2022-03-25 PROCEDURE — 87077 CULTURE AEROBIC IDENTIFY: CPT

## 2022-03-25 PROCEDURE — 87086 URINE CULTURE/COLONY COUNT: CPT

## 2022-03-25 PROCEDURE — ? COUNSELING

## 2022-03-25 PROCEDURE — 81002 URINALYSIS NONAUTO W/O SCOPE: CPT | Performed by: NURSE PRACTITIONER

## 2022-03-25 RX ORDER — DIPHENHYDRAMINE HYDROCHLORIDE 50 MG/ML
25 INJECTION INTRAMUSCULAR; INTRAVENOUS ONCE
Status: COMPLETED | OUTPATIENT
Start: 2022-03-25 | End: 2022-03-25

## 2022-03-25 RX ORDER — PREDNISONE 20 MG/1
2 TABLET ORAL QD
Qty: 56 | Refills: 0 | Status: ERX | COMMUNITY
Start: 2022-03-25

## 2022-03-25 RX ORDER — KETOROLAC TROMETHAMINE 30 MG/ML
30 INJECTION, SOLUTION INTRAMUSCULAR; INTRAVENOUS ONCE
Status: COMPLETED | OUTPATIENT
Start: 2022-03-25 | End: 2022-03-25

## 2022-03-25 RX ORDER — NITROFURANTOIN 25; 75 MG/1; MG/1
100 CAPSULE ORAL EVERY 12 HOURS
Qty: 10 CAPSULE | Refills: 0 | Status: SHIPPED | OUTPATIENT
Start: 2022-03-25 | End: 2022-03-30

## 2022-03-25 RX ADMIN — PREDNISONE 2: 20 TABLET ORAL at 00:00

## 2022-03-25 RX ADMIN — KETOROLAC TROMETHAMINE 30 MG: 30 INJECTION, SOLUTION INTRAMUSCULAR; INTRAVENOUS at 10:08

## 2022-03-25 RX ADMIN — DIPHENHYDRAMINE HYDROCHLORIDE 25 MG: 50 INJECTION INTRAMUSCULAR; INTRAVENOUS at 10:08

## 2022-03-25 ASSESSMENT — ENCOUNTER SYMPTOMS
VOMITING: 1
PHOTOPHOBIA: 1
FLANK PAIN: 0
BLURRED VISION: 0
SCALP TENDERNESS: 0
FEVER: 0
MIGRAINE HEADACHES: 1
HEADACHES: 1
NAUSEA: 1
CHILLS: 0

## 2022-03-25 ASSESSMENT — FIBROSIS 4 INDEX: FIB4 SCORE: 0.71

## 2022-03-25 ASSESSMENT — LOCATION ZONE DERM
LOCATION ZONE: TRUNK
LOCATION ZONE: ARM

## 2022-03-25 ASSESSMENT — LOCATION SIMPLE DESCRIPTION DERM
LOCATION SIMPLE: RIGHT UPPER ARM
LOCATION SIMPLE: LEFT FOREARM
LOCATION SIMPLE: ABDOMEN

## 2022-03-25 ASSESSMENT — LOCATION DETAILED DESCRIPTION DERM
LOCATION DETAILED: LEFT VENTRAL PROXIMAL FOREARM
LOCATION DETAILED: RIGHT ANTECUBITAL SKIN
LOCATION DETAILED: EPIGASTRIC SKIN

## 2022-03-25 ASSESSMENT — SEVERITY ASSESSMENT: SEVERITY: MILD TO MODERATE

## 2022-03-25 ASSESSMENT — PAIN INTENSITY VAS: HOW INTENSE IS YOUR PAIN 0 BEING NO PAIN, 10 BEING THE MOST SEVERE PAIN POSSIBLE?: 2/10 PAIN

## 2022-03-25 NOTE — PROCEDURE: ADDITIONAL NOTES
Additional Notes: Clobetasol cream doesn’t seem to be helping, so we discussed trying an oral treatment. \\nPatient states she was put on short course of prednisone ( 7 days) without resolution about 2 months ago.  We discussed metronidazole BID however patient notes she has some events she may be drinking at coming up and ultimately prefers to try the prednisone again.  Understands she will need to be on it for longer, will start with 40 mg daily and have 4 week follow up. Discussed side effects of oral steroids, risks/benefits.
Detail Level: Simple
Render Risk Assessment In Note?: no

## 2022-03-25 NOTE — PROGRESS NOTES
Subjective:     Syeda Santiago is a 22 y.o. female who presents for Headache (Started tuesday morning, nausea, vomiting.)      Headache   This is a new problem. The current episode started in the past 7 days (Syeda is a pleasant 22 year old female who presents to  today with complaints of a severe headache that started 3 days ago. ). The problem occurs constantly. The problem has been unchanged. The pain is located in the right unilateral region. The pain does not radiate. The pain quality is similar to prior headaches. The quality of the pain is described as sharp and stabbing (Ice pick behind the eye). The pain is at a severity of 7/10. Associated symptoms include nausea, photophobia and vomiting. Pertinent negatives include no blurred vision, fever or scalp tenderness. Treatments tried: Sumatriptin, Nurtec. The treatment provided mild relief. Her past medical history is significant for migraine headaches.   She is under the care of a neurologist for treatment of her migraine headaches.      Review of Systems   Constitutional: Negative for chills and fever.   Eyes: Positive for photophobia. Negative for blurred vision.   Gastrointestinal: Positive for nausea and vomiting.   Genitourinary: Negative for dysuria, flank pain, frequency, hematuria and urgency.   Neurological: Positive for headaches.       PMH:   Past Medical History:   Diagnosis Date   • Allergy     pineapple, seafood (mild)   • Anxiety    • Depression    • Head ache    • Migraine      ALLERGIES:   Allergies   Allergen Reactions   • Pineapple Hives, Itching, Swelling and Cough   • Shellfish Allergy Hives     SURGHX:   Past Surgical History:   Procedure Laterality Date   • SHOULDER ARTHROSCOPY W/ SLAP / LABRAL REPAIR Right 11/2017     SOCHX:   Social History     Socioeconomic History   • Marital status: Single   • Number of children: 0   • Highest education level: Some college, no degree   Occupational History   • Occupation:       Employer: OTHER     Comment: [employer]   Tobacco Use   • Smoking status: Never Smoker   • Smokeless tobacco: Never Used   Vaping Use   • Vaping Use: Never used   Substance and Sexual Activity   • Alcohol use: Never   • Drug use: Never   • Sexual activity: Not Currently     Partners: Male     Birth control/protection: I.U.D.     Social Determinants of Health     Financial Resource Strain: Low Risk    • Difficulty of Paying Living Expenses: Not very hard   Food Insecurity: No Food Insecurity   • Worried About Running Out of Food in the Last Year: Never true   • Ran Out of Food in the Last Year: Never true   Transportation Needs: No Transportation Needs   • Lack of Transportation (Medical): No   • Lack of Transportation (Non-Medical): No   Physical Activity: Insufficiently Active   • Days of Exercise per Week: 1 day   • Minutes of Exercise per Session: 40 min   Stress: Stress Concern Present   • Feeling of Stress : Very much   Social Connections: Unknown   • Frequency of Communication with Friends and Family: Twice a week   • Frequency of Social Gatherings with Friends and Family: More than three times a week   • Attends [employer]ism Services: Patient refused   • Active Member of Clubs or Organizations: No   • Attends Club or Organization Meetings: Patient refused   • Marital Status: Never    Housing Stability: Low Risk    • Unable to Pay for Housing in the Last Year: No   • Number of Places Lived in the Last Year: 2   • Unstable Housing in the Last Year: No     FH:   Family History   Problem Relation Age of Onset   • Other Mother         Aneurism   • Psychiatric Illness Mother         Anxiety and depression   • Arthritis Mother    • Thyroid Mother    • No Known Problems Father    • Thyroid Sister    • Psychiatric Illness Sister         Anxiety and depression   • Psychiatric Illness Brother         Suicide   • Kidney Disease Maternal Grandmother    • Cancer Maternal Grandfather         bone, skin   • Heart  "Attack Paternal Grandmother    • Heart Disease Paternal Grandmother         Heart transplant   • Hyperlipidemia Paternal Grandmother    • Hypertension Paternal Grandmother    • No Known Problems Paternal Grandfather    • No Known Problems Brother    • Diabetes Neg Hx          Objective:   /86 (BP Location: Left arm, Patient Position: Sitting, BP Cuff Size: Adult)   Pulse 97   Temp 36.5 °C (97.7 °F) (Temporal)   Resp 17   Ht 1.676 m (5' 6\")   Wt 92.5 kg (204 lb)   SpO2 97%   BMI 32.93 kg/m²     Physical Exam  Vitals and nursing note reviewed.   Constitutional:       General: She is not in acute distress.     Appearance: Normal appearance. She is not ill-appearing.   HENT:      Head: Normocephalic and atraumatic.      Right Ear: External ear normal.      Left Ear: External ear normal.      Nose: No congestion or rhinorrhea.      Mouth/Throat:      Mouth: Mucous membranes are moist.   Eyes:      Extraocular Movements: Extraocular movements intact.      Pupils: Pupils are equal, round, and reactive to light.   Cardiovascular:      Rate and Rhythm: Normal rate and regular rhythm.      Pulses: Normal pulses.      Heart sounds: Normal heart sounds.   Pulmonary:      Effort: Pulmonary effort is normal.      Breath sounds: Normal breath sounds.   Abdominal:      General: Abdomen is flat. Bowel sounds are normal.      Palpations: Abdomen is soft.      Tenderness: There is no abdominal tenderness. There is no right CVA tenderness or left CVA tenderness.   Musculoskeletal:         General: Normal range of motion.      Cervical back: Normal range of motion and neck supple.   Skin:     General: Skin is warm and dry.      Capillary Refill: Capillary refill takes less than 2 seconds.   Neurological:      General: No focal deficit present.      Mental Status: She is alert and oriented to person, place, and time. Mental status is at baseline.      Cranial Nerves: No cranial nerve deficit.      Sensory: Sensory deficit " present.      Motor: No weakness.      Coordination: Coordination normal.      Gait: Gait normal.      Deep Tendon Reflexes: Reflexes normal.   Psychiatric:         Mood and Affect: Mood normal.         Behavior: Behavior normal.         Thought Content: Thought content normal.         Judgment: Judgment normal.       Results for orders placed or performed in visit on 03/25/22   POCT Urinalysis   Result Value Ref Range    POC Color yellow Negative    POC Appearance cloudy Negative    POC Leukocyte Esterase large Negative    POC Nitrites positive Negative    POC Urobiligen 0.2 Negative (0.2) mg/dL    POC Protein trace Negative mg/dL    POC Urine PH 7.5 5.0 - 8.0    POC Blood small Negative    POC Specific Gravity 1.020 <1.005 - >1.030    POC Ketones negative Negative mg/dL    POC Bilirubin negative Negative mg/dL    POC Glucose negative Negative mg/dL   POCT PREGNANCY   Result Value Ref Range    POC Urine Pregnancy Test negative Negative    Internal Control Positive Valid     Internal Control Negative Valid        Assessment/Plan:   Assessment    1. Migraine with aura and without status migrainosus, not intractable  ketorolac (TORADOL) injection 30 mg    diphenhydrAMINE (BENADRYL) injection 25 mg    URINE CULTURE(NEW)   2. Nausea  POCT Urinalysis    URINE CULTURE(NEW)    POCT PREGNANCY   3. Benign essential microscopic hematuria  URINE CULTURE(NEW)    nitrofurantoin (MACROBID) 100 MG Cap   4. Acute cystitis with hematuria  nitrofurantoin (MACROBID) 100 MG Cap     POCT urine collected for complaints of nausea and vomiting.  Urine testing significant for UTI.  Urine sent for culture.  She will be empirically started on Macrobid for treatment of UTI.  Additionally, she was given Toradol in the clinic today for treatment of migraine headache.  She has had this in the past and tolerated this well.  25 mg IM Benadryl given for relief of nausea.  Patient educated of drowsy side effects.  She verbalizes agreement  understanding of plan of care.  Follow-up for worsening or persistent symptoms.  AVS handout given and reviewed with patient. Pt educated on red flags and when to seek treatment back in ER or UC.

## 2022-03-27 LAB
BACTERIA UR CULT: ABNORMAL
BACTERIA UR CULT: ABNORMAL
SIGNIFICANT IND 70042: ABNORMAL
SITE SITE: ABNORMAL
SOURCE SOURCE: ABNORMAL

## 2022-04-21 ENCOUNTER — APPOINTMENT (RX ONLY)
Dept: URBAN - METROPOLITAN AREA CLINIC 22 | Facility: CLINIC | Age: 22
Setting detail: DERMATOLOGY
End: 2022-04-21

## 2022-04-21 DIAGNOSIS — L43.8 OTHER LICHEN PLANUS: ICD-10-CM | Status: IMPROVED

## 2022-04-21 PROCEDURE — ? COUNSELING

## 2022-04-21 PROCEDURE — ? PRESCRIPTION

## 2022-04-21 PROCEDURE — 99214 OFFICE O/P EST MOD 30 MIN: CPT

## 2022-04-21 PROCEDURE — ? ADDITIONAL NOTES

## 2022-04-21 RX ORDER — PREDNISONE 10 MG/1
1 TABLET ORAL
Qty: 42 | Refills: 0 | Status: ERX | COMMUNITY
Start: 2022-04-21

## 2022-04-21 RX ADMIN — PREDNISONE 1: 10 TABLET ORAL at 00:00

## 2022-04-21 ASSESSMENT — LOCATION SIMPLE DESCRIPTION DERM: LOCATION SIMPLE: CHEST

## 2022-04-21 ASSESSMENT — SEVERITY ASSESSMENT: SEVERITY: MILD

## 2022-04-21 ASSESSMENT — LOCATION DETAILED DESCRIPTION DERM: LOCATION DETAILED: LEFT MEDIAL SUPERIOR CHEST

## 2022-04-21 ASSESSMENT — LOCATION ZONE DERM: LOCATION ZONE: TRUNK

## 2022-04-21 NOTE — PROCEDURE: ADDITIONAL NOTES
Detail Level: Simple
Render Risk Assessment In Note?: no
Additional Notes: Patient notes she is much better. She has completed 4 weeks of 40 mg of prednisone and now will taper off over the next month. \\nShe states she has been a bit hungry and irritable but no major other side effects. Itching is gone, several lesions persist but are better.  Discussed concurrently using her topical CS and will follow up in one additional month.

## 2022-05-12 ENCOUNTER — TELEMEDICINE (OUTPATIENT)
Dept: NEUROLOGY | Facility: MEDICAL CENTER | Age: 22
End: 2022-05-12
Attending: PSYCHIATRY & NEUROLOGY
Payer: COMMERCIAL

## 2022-05-12 VITALS — BODY MASS INDEX: 32.78 KG/M2 | HEIGHT: 66 IN | WEIGHT: 204 LBS

## 2022-05-12 DIAGNOSIS — G43.109 MIGRAINE WITH AURA AND WITHOUT STATUS MIGRAINOSUS, NOT INTRACTABLE: Primary | ICD-10-CM

## 2022-05-12 PROCEDURE — 99214 OFFICE O/P EST MOD 30 MIN: CPT | Mod: 95 | Performed by: PSYCHIATRY & NEUROLOGY

## 2022-05-12 RX ORDER — TOPIRAMATE 25 MG/1
50 TABLET ORAL 2 TIMES DAILY
Qty: 60 TABLET | Refills: 3 | Status: SHIPPED | OUTPATIENT
Start: 2022-05-12 | End: 2022-06-11

## 2022-05-12 RX ORDER — PREDNISONE 20 MG/1
20 TABLET ORAL DAILY
COMMUNITY
End: 2023-08-15

## 2022-05-12 RX ORDER — ZOLMITRIPTAN 5 MG/1
TABLET, FILM COATED ORAL
Qty: 9 TABLET | Refills: 5 | Status: SHIPPED | OUTPATIENT
Start: 2022-05-12 | End: 2022-06-11

## 2022-05-12 ASSESSMENT — FIBROSIS 4 INDEX: FIB4 SCORE: 0.71

## 2022-05-12 ASSESSMENT — PAIN SCALES - GENERAL: PAINLEVEL: NO PAIN

## 2022-05-12 NOTE — PROGRESS NOTES
"[facility] NEUROLOGY  GENERAL NEUROLOGY  FOLLOW-UP VISIT    This evaluation was conducted via Zoom using secure and encrypted videoconferencing technology. The patient was in their home in the [facility].    The patient's identity was confirmed and verbal consent was obtained for this virtual visit.    CC: migraine with aura    INTERVAL HISTORY:  Syeda Santiago is a 22 y.o. woman with migraine with aura as well as obesity, MAXIM, and MDD.  I last saw her in the clinic on 2/28/2022.  At that time I recommended she start propranolol wit ha goal dosage of 20 mg BID.  I also prescribed Nurtec 75 mg PRN.  Today, I followed up with her via Zoom, and she provided the following interval history:    The following is a summary of headache symptoms, presented in my standard format:     Family History: mother and sister have headaches/migraines  Age at onset: 15-16  Location: right retro-orbital, left retro-orbital, sub-occipital  Radiation: none  Frequency: 1-2 headaches/week  Duration: hours (6-12) to days  Headache Days/Month: 15/30  Quality: \"ice pick, stabbing\"  Intensity: 8.5/10  Aura: often present upon awakening, if onset during day fatigue, light sensitivity  Photophobia/Phonophobia/Nausea/Vomiting: yes/yes/yes/occasaionlly  Provoked by Physical Activity?: no  Triggers: sleep deprivation, hunger  Associated Symptoms:   Autonomic Signs (such as ptosis, miosis, conjunctival injection, rhinorrhea, increased lacrimation): right eyelid droopiness  Head Trauma: none  Association with Menses: IUD, no period  ED Visits: none, went to  in 1/2022  Hospitalizations: no  Missed Work Days: (), occasionally misses work  Sleep: 6-8 hours/night  Caffeine Intake: 1 cup of coffee/week, 3 energy drinks/month  Hydration: eats breakfast and lunch consistently  Nutrition: skips meals  Exercise:   Analgesic Overuse:      Current Medication Regimen:  - sumatriptan nasal spray: ineffective     Medications Tried: " Response  Preventive:  - propranolol 20 mg BID was ineffective; didn't notice any side effects     Abortive:  - eletriptan: lost effectiveness  - rizatriptan: lost effectiveness  - Nurtec: associated with nausea and drowsiness     Medications Not Tried:  - tricyclic antidepressants: would like to avoid med-med interaction w/ sertraline  - venlafaxine: would like to avoid med-med interaction w/ sertraline  - topiramate:     MEDICATIONS:  Current Outpatient Medications   Medication Sig   • predniSONE (DELTASONE) 20 MG Tab Take 20 mg by mouth every day.   • topiramate (TOPAMAX) 25 MG Tab Take 2 Tablets by mouth in the morning and 2 Tablets in the evening. Do all this for 30 days.   • zolmitriptan (ZOMIG) 5 MG tablet Take 5 mg at the onset of aura/HA; may re-dose x1 after 2 hrs if HA persists; MDD: 10 mg; do not use >2 days/week.   • benzoyl peroxide 5 % gel APPLY 1 GRAM TOPICALLY TO THE AFFECTED AREA DAILY. WASH HANDS AFTER APPLICATION.   • Cholecalciferol (VITAMIN D3) 2000 UNIT Cap Take 1 Capsule by mouth every day.     MEDICAL, SOCIAL, AND FAMILY HISTORY:  There is no change in the patient's ROS or medical, social, or family histories since the previous visit on 2/28/2022.    REVIEW OF SYSTEMS:  A ROS was completed.  Pertinent positives and negatives were included in the HPI, above.  All other systems were reviewed and are negative.    PHYSICAL EXAM:  General/Medical:  - NAD    Neuro:  MENTAL STATUS: awake and alert; no deficits of speech or language; oriented to person, place, and time; affect was appropriate to situation; pleasant, cooperative    CRANIAL NERVES:    II: acuity: NT, fields: NT, pupils: NT, discs: NT    III/IV/VI: versions: grossly intact    V: facial sensation: NT    VII: facial expression: symmetric    VIII: hearing: intact to voice    IX/X: palate: NT    XI: shoulder shrug: NT    XII: tongue: NT    MOTOR:  - bulk: NT  - tone: NT  Upper Extremity Strength  (R/L)    NT   Elbow flexion NT   Elbow  extension NT   Shoulder abduction NT     Lower Extremity Strength  (R/L)   Hip flexion NT   Knee extension NT   Knee flexion NT   Ankle plantarflexion NT   Ankle dorsiflexion NT     - pronator drift: NT  - abnormal movements: none    SENSATION:  - light touch: NT  - vibration (R/L, seconds): NT at the great toes  - pinprick: NT  - proprioception: NT  - Romberg: absent    COORDINATION:  - finger to nose: NT  - finger tapping: NT    REFLEXES:  Reflex Right Left   BR NT NT   Biceps NT NT   Triceps NT NT   Patellae NT NT   Achilles NT NT   Toes NT NT     GAIT:  - NT    REVIEW OF IMAGING STUDIES:  No additional data since the last visit.    REVIEW OF LABORATORY STUDIES:  No additional, pertinent data since the last visit.    ASSESSMENT:  Syeda Santiago is a 22 y.o. woman with chronic migraine with aura as well as obesity, MAXIM, and MDD.  Plans/recommendations as follows:    PLAN:  Chronic Migraine w/ Aura:  Prevention:  - start topiramate with a goal dosage of 50 mg daily; most common side effects discussed  - try supplementing:  - magnesium: 400-600 mg once or 200-300 mg twice daily  - riboflavin (vitamin B2): 400 mg once daily  - coenzyme Q10: 300 mg daily  - get 7-9 hours of sleep per night; can try supplementing melatonin 2-10 mg, 2-3 hours before bedtime  - drink plenty of fluids (urine should be nearly clear)  - avoid excessive caffeine intake (no more than 2 servings per day and nothing in the afternoon)  - eat regular meals (don't skip meals)  - get moderate exercise (even just a 20 minute walk daily)    Rescue:  - stop Nurtec  - zolmitriptan 5 mg: take this at the onset of aura or headache pain; may re-dose x1 after 2 hours if headache persists; do not use more than 2 days/week  - do not use analgesics (e.g., ibuprofen, acetaminophen) more than 2 days per week in order to avoid analgesic rebound headaches    - keep a headache log    Follow-Up:  - Return in about 2 months (around 7/12/2022).    Signed: Tyler MELLO  ANUJ Dolan.

## 2022-05-19 ENCOUNTER — APPOINTMENT (RX ONLY)
Dept: URBAN - METROPOLITAN AREA CLINIC 22 | Facility: CLINIC | Age: 22
Setting detail: DERMATOLOGY
End: 2022-05-19

## 2022-05-19 DIAGNOSIS — L81.0 POSTINFLAMMATORY HYPERPIGMENTATION: ICD-10-CM

## 2022-05-19 DIAGNOSIS — L43.8 OTHER LICHEN PLANUS: ICD-10-CM | Status: RESOLVING

## 2022-05-19 PROCEDURE — ? ADDITIONAL NOTES

## 2022-05-19 PROCEDURE — 99213 OFFICE O/P EST LOW 20 MIN: CPT

## 2022-05-19 PROCEDURE — ? COUNSELING

## 2022-05-19 ASSESSMENT — SEVERITY ASSESSMENT: SEVERITY: MILD

## 2022-05-19 ASSESSMENT — LOCATION DETAILED DESCRIPTION DERM
LOCATION DETAILED: RIGHT MEDIAL UPPER BACK
LOCATION DETAILED: LEFT MEDIAL SUPERIOR CHEST

## 2022-05-19 ASSESSMENT — LOCATION SIMPLE DESCRIPTION DERM
LOCATION SIMPLE: CHEST
LOCATION SIMPLE: RIGHT UPPER BACK

## 2022-05-19 ASSESSMENT — LOCATION ZONE DERM: LOCATION ZONE: TRUNK

## 2022-05-19 ASSESSMENT — PAIN INTENSITY VAS: HOW INTENSE IS YOUR PAIN 0 BEING NO PAIN, 10 BEING THE MOST SEVERE PAIN POSSIBLE?: 1/10 PAIN

## 2022-05-19 NOTE — PROCEDURE: ADDITIONAL NOTES
Detail Level: Simple
Additional Notes: Patient completed course of Prednisone with good result.  She has limited active lesions which she will continue treating with topical CS prn.
Render Risk Assessment In Note?: no

## 2023-02-21 NOTE — TELEPHONE ENCOUNTER
PA renewal Approved for Nurtec 75mg   Qty 8/30   Case: [id number]  Dates: 1/22/23 to  2/21/24    Pharmacy: [facility] # [phone]

## 2023-08-15 ENCOUNTER — OFFICE VISIT (OUTPATIENT)
Dept: URGENT CARE | Facility: CLINIC | Age: 23
End: 2023-08-15
Payer: COMMERCIAL

## 2023-08-15 VITALS
HEART RATE: 84 BPM | OXYGEN SATURATION: 97 % | RESPIRATION RATE: 18 BRPM | TEMPERATURE: 98.3 F | WEIGHT: 194 LBS | DIASTOLIC BLOOD PRESSURE: 88 MMHG | BODY MASS INDEX: 31.18 KG/M2 | HEIGHT: 66 IN | SYSTOLIC BLOOD PRESSURE: 118 MMHG

## 2023-08-15 DIAGNOSIS — R52 GENERALIZED BODY ACHES: ICD-10-CM

## 2023-08-15 DIAGNOSIS — G43.119 INTRACTABLE MIGRAINE WITH AURA WITHOUT STATUS MIGRAINOSUS: Primary | ICD-10-CM

## 2023-08-15 LAB
FLUAV RNA SPEC QL NAA+PROBE: NEGATIVE
FLUBV RNA SPEC QL NAA+PROBE: NEGATIVE
RSV RNA SPEC QL NAA+PROBE: NEGATIVE
SARS-COV-2 RNA RESP QL NAA+PROBE: NEGATIVE

## 2023-08-15 PROCEDURE — 3074F SYST BP LT 130 MM HG: CPT

## 2023-08-15 PROCEDURE — 3079F DIAST BP 80-89 MM HG: CPT

## 2023-08-15 PROCEDURE — 0241U POCT CEPHEID COV-2, FLU A/B, RSV - PCR: CPT

## 2023-08-15 PROCEDURE — 99213 OFFICE O/P EST LOW 20 MIN: CPT | Mod: 25

## 2023-08-15 RX ORDER — KETOROLAC TROMETHAMINE 30 MG/ML
30 INJECTION, SOLUTION INTRAMUSCULAR; INTRAVENOUS ONCE
Status: COMPLETED | OUTPATIENT
Start: 2023-08-15 | End: 2023-08-15

## 2023-08-15 RX ORDER — ONDANSETRON 4 MG/1
4 TABLET, ORALLY DISINTEGRATING ORAL EVERY 6 HOURS PRN
COMMUNITY

## 2023-08-15 RX ADMIN — KETOROLAC TROMETHAMINE 30 MG: 30 INJECTION, SOLUTION INTRAMUSCULAR; INTRAVENOUS at 16:50

## 2023-08-15 ASSESSMENT — ENCOUNTER SYMPTOMS
COUGH: 0
DIARRHEA: 0
CHILLS: 0
TINGLING: 0
EYE DISCHARGE: 0
NERVOUS/ANXIOUS: 0
SHORTNESS OF BREATH: 0
PHOTOPHOBIA: 1
DEPRESSION: 0
HEADACHES: 1
EYE PAIN: 1
ABDOMINAL PAIN: 0
FOCAL WEAKNESS: 0
WEAKNESS: 0
FEVER: 0
SORE THROAT: 0
EYE REDNESS: 0
MYALGIAS: 1

## 2023-08-15 NOTE — PROGRESS NOTES
Subjective:   Syeda Santiago is a 23 y.o. female who presents for Body Aches (Generalized body aches, headache, Nausea, Vomiting, dizziness.)      HPI:  Syeda comes in complaining of body aches all over since Sunday.  These were accompanied by a unilateral headache behind her right eye.  She does states she has a history of migraines.  This is the typical presentation for her migraine.  These symptoms were worse on Monday morning and she also got some episodes of nausea and vomiting 3 times on Monday, and twice today.  She has had a mixture of chills and hot flashes intermittently.  She denies any abdominal pain or diarrhea.  She denies any dysuria, frequency or urgency.  She denies pharyngitis or otalgia.  She states in the past she has managed her migraines with sumatriptan nasal spray, and also has a prescription for Nurtec.  She tried the sumatriptan on Sunday with little relief.  She states the Nurtec has not worked for her in the past so she did not try this.    Review of Systems   Constitutional:  Negative for chills and fever.   HENT:  Negative for congestion, ear pain and sore throat.    Eyes:  Positive for photophobia and pain. Negative for discharge and redness.   Respiratory:  Negative for cough and shortness of breath.    Cardiovascular:  Negative for chest pain.   Gastrointestinal:  Positive for nausea and vomiting. Negative for abdominal pain and diarrhea.   Genitourinary:  Negative for dysuria.   Musculoskeletal:  Positive for myalgias.   Skin:  Negative for rash.   Neurological:  Positive for headaches. Negative for tingling, focal weakness and weakness.   Psychiatric/Behavioral:  Negative for depression. The patient is not nervous/anxious.        Medications:    benzoyl peroxide  predniSONE Tabs  Vitamin D3 Caps    Allergies: Pineapple and Shellfish allergy    Problem List: Syeda Santiago does not have any pertinent problems on file.    Surgical History:  Past Surgical History:   Procedure Laterality  "Date    SHOULDER ARTHROSCOPY W/ SLAP / LABRAL REPAIR Right 11/2017       Past Social Hx: Syeda Santiago  reports that she has never smoked. She has never used smokeless tobacco. She reports current alcohol use. She reports that she does not use drugs.     Past Family Hx:  Syeda Santiago family history includes Arthritis in her mother; Cancer in her maternal grandfather; Heart Attack in her paternal grandmother; Heart Disease in her paternal grandmother; Hyperlipidemia in her paternal grandmother; Hypertension in her paternal grandmother; Kidney Disease in her maternal grandmother; No Known Problems in her brother, father, and paternal grandfather; Other in her mother; Psychiatric Illness in her brother, mother, and sister; Thyroid in her mother and sister.     Problem list, medications, and allergies reviewed by myself today in Epic.     Objective:     /88   Pulse 84   Temp 36.8 °C (98.3 °F) (Temporal)   Resp 18   Ht 1.676 m (5' 6\")   Wt 88 kg (194 lb 0.1 oz)   SpO2 97%   BMI 31.31 kg/m²     Physical Exam  Vitals reviewed.   Constitutional:       General: She is not in acute distress.     Appearance: Normal appearance. She is not ill-appearing or toxic-appearing.   HENT:      Head: Normocephalic and atraumatic.      Right Ear: Tympanic membrane, ear canal and external ear normal. There is no impacted cerumen.      Left Ear: Tympanic membrane, ear canal and external ear normal. There is no impacted cerumen.      Nose: Nose normal. No congestion or rhinorrhea.      Mouth/Throat:      Mouth: Mucous membranes are moist.   Eyes:      General: No scleral icterus.     Conjunctiva/sclera: Conjunctivae normal.   Cardiovascular:      Rate and Rhythm: Normal rate and regular rhythm.      Heart sounds: Normal heart sounds. No murmur heard.     No friction rub. No gallop.   Pulmonary:      Effort: Pulmonary effort is normal.      Breath sounds: No wheezing, rhonchi or rales.   Abdominal:      General: There is no " distension.   Musculoskeletal:         General: Normal range of motion.      Cervical back: Normal range of motion and neck supple. No rigidity or tenderness.   Skin:     General: Skin is warm and dry.      Capillary Refill: Capillary refill takes less than 2 seconds.   Neurological:      General: No focal deficit present.      Mental Status: She is alert and oriented to person, place, and time.      Cranial Nerves: No cranial nerve deficit.      Sensory: No sensory deficit.      Motor: No weakness.      Coordination: Coordination normal.      Gait: Gait normal.   Psychiatric:         Mood and Affect: Mood normal.         Behavior: Behavior normal.       Lab Results/POC Test Results   Results for orders placed or performed in visit on 08/15/23   POCT CEPHEID COV-2, FLU A/B, RSV - PCR   Result Value Ref Range    SARS-CoV-2 by PCR Negative Negative, Invalid    Influenza virus A RNA Negative Negative, Invalid    Influenza virus B, PCR Negative Negative, Invalid    RSV, PCR Negative Negative, Invalid             Assessment/Plan:     Diagnosis and associated orders:     No diagnosis found.   Comments/MDM:     1. Intractable migraine with aura without status migrainosus   Unilateral headache behind her right eye.  She does states she has a history of migraines.  This is the typical presentation for her migraine. Her meds have not helped. I will give her a toradol shot, hopefully this will abort the migraine.  - ketorolac (Toradol) injection 30 mg    2. Generalized body aches  Discussed the possibility of viral infection, which may have triggered the migraine. I did run a covid, flu PCR which was negative.   - POCT CEPHEID COV-2, FLU A/B, RSV - PCR  Instr her to return to clinic if symptoms do not resolve or worsen. Strict ER precautions given should her pain get worse or any neurological deficits develop. She states her Nurtec does not work for her, I instr her to f/u with PCP to discuss alternative CGRP inhibitors. She  states she understands all instr and agrees with POC.         Differential diagnosis, natural history, supportive care, and indications for immediate follow-up discussed.    Advised the patient to follow-up with the primary care physician for recheck, reevaluation, and consideration of further management.    Please note that this dictation was created using voice recognition software. I have made a reasonable attempt to correct obvious errors, but I expect that there are errors of grammar and possibly content that I did not discover before finalizing the note.    This note was electronically signed by MELISSA Cisse

## 2023-08-16 ASSESSMENT — ENCOUNTER SYMPTOMS
VOMITING: 1
NAUSEA: 1